# Patient Record
Sex: FEMALE | Race: BLACK OR AFRICAN AMERICAN | Employment: FULL TIME | URBAN - METROPOLITAN AREA
[De-identification: names, ages, dates, MRNs, and addresses within clinical notes are randomized per-mention and may not be internally consistent; named-entity substitution may affect disease eponyms.]

---

## 2018-02-22 ENCOUNTER — HOSPITAL ENCOUNTER (EMERGENCY)
Facility: HOSPITAL | Age: 38
Discharge: HOME | End: 2018-02-22
Attending: EMERGENCY MEDICINE
Payer: MEDICAID

## 2018-02-22 ENCOUNTER — APPOINTMENT (OUTPATIENT)
Dept: GENERAL RADIOLOGY | Facility: HOSPITAL | Age: 38
End: 2018-02-22
Attending: EMERGENCY MEDICINE
Payer: MEDICAID

## 2018-02-22 VITALS
SYSTOLIC BLOOD PRESSURE: 114 MMHG | TEMPERATURE: 99 F | OXYGEN SATURATION: 98 % | BODY MASS INDEX: 33.43 KG/M2 | RESPIRATION RATE: 17 BRPM | HEIGHT: 67 IN | WEIGHT: 213 LBS | HEART RATE: 67 BPM | DIASTOLIC BLOOD PRESSURE: 83 MMHG

## 2018-02-22 DIAGNOSIS — R00.2 PALPITATIONS: Primary | ICD-10-CM

## 2018-02-22 LAB
ANION GAP SERPL CALC-SCNC: 6 MMOL/L (ref 0–18)
BASOPHILS # BLD: 0 K/UL (ref 0–0.2)
BASOPHILS NFR BLD: 0 %
BUN SERPL-MCNC: 13 MG/DL (ref 8–20)
BUN/CREAT SERPL: 19.1 (ref 10–20)
CALCIUM SERPL-MCNC: 8.9 MG/DL (ref 8.5–10.5)
CHLORIDE SERPL-SCNC: 107 MMOL/L (ref 95–110)
CO2 SERPL-SCNC: 23 MMOL/L (ref 22–32)
CREAT SERPL-MCNC: 0.68 MG/DL (ref 0.5–1.5)
EOSINOPHIL # BLD: 0.3 K/UL (ref 0–0.7)
EOSINOPHIL NFR BLD: 4 %
ERYTHROCYTE [DISTWIDTH] IN BLOOD BY AUTOMATED COUNT: 13.7 % (ref 11–15)
GLUCOSE SERPL-MCNC: 98 MG/DL (ref 70–99)
HCT VFR BLD AUTO: 33.6 % (ref 35–48)
HGB BLD-MCNC: 11 G/DL (ref 12–16)
LYMPHOCYTES # BLD: 2.8 K/UL (ref 1–4)
LYMPHOCYTES NFR BLD: 38 %
MCH RBC QN AUTO: 29.6 PG (ref 27–32)
MCHC RBC AUTO-ENTMCNC: 32.6 G/DL (ref 32–37)
MCV RBC AUTO: 90.9 FL (ref 80–100)
MONOCYTES # BLD: 0.6 K/UL (ref 0–1)
MONOCYTES NFR BLD: 8 %
NEUTROPHILS # BLD AUTO: 3.7 K/UL (ref 1.8–7.7)
NEUTROPHILS NFR BLD: 50 %
OSMOLALITY UR CALC.SUM OF ELEC: 282 MOSM/KG (ref 275–295)
PLATELET # BLD AUTO: 288 K/UL (ref 140–400)
PMV BLD AUTO: 9.5 FL (ref 7.4–10.3)
POTASSIUM SERPL-SCNC: 3.4 MMOL/L (ref 3.3–5.1)
RBC # BLD AUTO: 3.69 M/UL (ref 3.7–5.4)
SODIUM SERPL-SCNC: 136 MMOL/L (ref 136–144)
TROPONIN I SERPL-MCNC: 0.02 NG/ML (ref ?–0.03)
WBC # BLD AUTO: 7.3 K/UL (ref 4–11)

## 2018-02-22 PROCEDURE — 93005 ELECTROCARDIOGRAM TRACING: CPT

## 2018-02-22 PROCEDURE — 85007 BL SMEAR W/DIFF WBC COUNT: CPT | Performed by: EMERGENCY MEDICINE

## 2018-02-22 PROCEDURE — 85025 COMPLETE CBC W/AUTO DIFF WBC: CPT | Performed by: EMERGENCY MEDICINE

## 2018-02-22 PROCEDURE — 93010 ELECTROCARDIOGRAM REPORT: CPT | Performed by: EMERGENCY MEDICINE

## 2018-02-22 PROCEDURE — 80048 BASIC METABOLIC PNL TOTAL CA: CPT | Performed by: EMERGENCY MEDICINE

## 2018-02-22 PROCEDURE — 71045 X-RAY EXAM CHEST 1 VIEW: CPT | Performed by: EMERGENCY MEDICINE

## 2018-02-22 PROCEDURE — 84484 ASSAY OF TROPONIN QUANT: CPT | Performed by: EMERGENCY MEDICINE

## 2018-02-22 PROCEDURE — 36415 COLL VENOUS BLD VENIPUNCTURE: CPT

## 2018-02-22 PROCEDURE — 99285 EMERGENCY DEPT VISIT HI MDM: CPT

## 2018-02-22 RX ORDER — CHOLECALCIFEROL (VITAMIN D3) 125 MCG
50000 TABLET ORAL WEEKLY
COMMUNITY
End: 2019-05-31

## 2018-02-22 NOTE — ED INITIAL ASSESSMENT (HPI)
Dagoberto Cortes is here for eval of fluttering sensation in chest and slight dyspnea intermittently for past 12 hrs.

## 2018-02-22 NOTE — ED NOTES
Patient arrives with complaints of palpitations since around 1100 in this morning and has been intermittent throughout the day. Denies N/V/D/F. Per pt, smokes every day approximately 6-7 cigarettes.

## 2018-02-22 NOTE — ED PROVIDER NOTES
Patient Seen in: [facility] AND [facility] Emergency Department    History   Patient presents with:  Arrythmia/Palpitations (cardiovascular)    Stated Complaint: \"fluttering\" feeling    HPI    71-year-old female without significant past medical history present normal.  Moving extremities equally ×4. Skin: warm and dry, no rashes.   Musculoskeletal: neck is supple non tender        Extremities are symmetrical, full range of motion  Psychiatric: patient is oriented X 3, there is no agitation    DIFFERENTIAL DIAGNO Impression:  Palpitations  (primary encounter diagnosis)    Disposition:  Discharge  2/22/2018  1:54 am    Follow-up:  own physician              Medications Prescribed:  Current Discharge Medication List

## 2018-07-30 ENCOUNTER — HOSPITAL ENCOUNTER (EMERGENCY)
Facility: HOSPITAL | Age: 38
Discharge: HOME | End: 2018-07-30
Attending: EMERGENCY MEDICINE
Payer: MEDICAID

## 2018-07-30 VITALS
BODY MASS INDEX: 33 KG/M2 | OXYGEN SATURATION: 100 % | DIASTOLIC BLOOD PRESSURE: 83 MMHG | SYSTOLIC BLOOD PRESSURE: 128 MMHG | RESPIRATION RATE: 22 BRPM | HEART RATE: 82 BPM | TEMPERATURE: 99 F | WEIGHT: 213 LBS

## 2018-07-30 DIAGNOSIS — T78.2XXA ANAPHYLAXIS, INITIAL ENCOUNTER: Primary | ICD-10-CM

## 2018-07-30 PROCEDURE — 99283 EMERGENCY DEPT VISIT LOW MDM: CPT

## 2018-07-30 PROCEDURE — 96372 THER/PROPH/DIAG INJ SC/IM: CPT

## 2018-07-30 RX ORDER — ONDANSETRON 2 MG/ML
4 INJECTION INTRAMUSCULAR; INTRAVENOUS ONCE
Status: DISCONTINUED | OUTPATIENT
Start: 2018-07-30 | End: 2018-07-30

## 2018-07-30 RX ORDER — ONDANSETRON 2 MG/ML
INJECTION INTRAMUSCULAR; INTRAVENOUS
Status: COMPLETED
Start: 2018-07-30 | End: 2018-07-30

## 2018-07-30 NOTE — ED PROVIDER NOTES
Patient Seen in: [facility] Emergency Department    History   Patient presents with:   Allergic Rxn Allergies (immune)    Stated Complaint: allergic reation    HPI    Patient is a 53-year-old female who presents to the emergency department with a c and oriented to person, place, and time. Skin: Skin is warm and dry. Rash noted. Rash is urticarial.   Few generalized urticaria noted   Nursing note and vitals reviewed.           ED Course   Labs Reviewed - No data to display    ED Course as of Jul 30 1

## 2018-11-14 ENCOUNTER — HOSPITAL ENCOUNTER (EMERGENCY)
Facility: HOSPITAL | Age: 38
Discharge: HOME | End: 2018-11-14
Attending: EMERGENCY MEDICINE
Payer: MEDICAID

## 2018-11-14 VITALS
BODY MASS INDEX: 33.9 KG/M2 | TEMPERATURE: 99 F | OXYGEN SATURATION: 99 % | RESPIRATION RATE: 16 BRPM | DIASTOLIC BLOOD PRESSURE: 82 MMHG | HEART RATE: 86 BPM | HEIGHT: 67 IN | WEIGHT: 216 LBS | SYSTOLIC BLOOD PRESSURE: 137 MMHG

## 2018-11-14 DIAGNOSIS — G56.01 RIGHT CARPAL TUNNEL SYNDROME: Primary | ICD-10-CM

## 2018-11-14 PROCEDURE — 99282 EMERGENCY DEPT VISIT SF MDM: CPT

## 2018-11-14 NOTE — ED PROVIDER NOTES
Patient Seen in: [facility] AND [facility] Emergency Department    History   Patient presents with:  Numbness Weakness (neurologic)    Stated Complaint:     HPI    39-year-old female with no significant past medical history presents to the emergency department f pulses  Pulmonary/Chest: CTA b/l with no rales, wheezes. No chest wall tenderness  Abdominal: Nontender. Nondistended. Soft. Bowel sounds are normal.   Back:   : Musculoskeletal: Normal range of motion. No deformity.    Lymphadenopathy: No sig cervica

## 2018-11-14 NOTE — ED INITIAL ASSESSMENT (HPI)
Pt came in for burning and tingling with decreased sensation in her right hand for 3 weeks, worse when sleeping. CMS intact, able to complete ROM. RR even and nonlabored, speaking in full sentences, ambulatory with steady gait.

## 2019-05-31 ENCOUNTER — APPOINTMENT (OUTPATIENT)
Dept: CT IMAGING | Facility: HOSPITAL | Age: 39
DRG: 313 | End: 2019-05-31
Attending: EMERGENCY MEDICINE
Payer: MEDICAID

## 2019-05-31 ENCOUNTER — HOSPITAL ENCOUNTER (INPATIENT)
Facility: HOSPITAL | Age: 39
LOS: 1 days | Discharge: HOME | DRG: 313 | End: 2019-06-01
Attending: EMERGENCY MEDICINE | Admitting: HOSPITALIST
Payer: MEDICAID

## 2019-05-31 ENCOUNTER — APPOINTMENT (OUTPATIENT)
Dept: CV DIAGNOSTICS | Facility: HOSPITAL | Age: 39
DRG: 313 | End: 2019-05-31
Attending: INTERNAL MEDICINE
Payer: MEDICAID

## 2019-05-31 DIAGNOSIS — R77.8 ELEVATED TROPONIN: ICD-10-CM

## 2019-05-31 DIAGNOSIS — R00.2 PALPITATION: ICD-10-CM

## 2019-05-31 DIAGNOSIS — R06.00 DYSPNEA, UNSPECIFIED TYPE: ICD-10-CM

## 2019-05-31 DIAGNOSIS — R07.89 CHEST PAIN, ATYPICAL: Primary | ICD-10-CM

## 2019-05-31 PROBLEM — R79.89 ELEVATED TROPONIN: Status: ACTIVE | Noted: 2019-05-31

## 2019-05-31 PROCEDURE — 93306 TTE W/DOPPLER COMPLETE: CPT | Performed by: INTERNAL MEDICINE

## 2019-05-31 PROCEDURE — 99222 1ST HOSP IP/OBS MODERATE 55: CPT | Performed by: HOSPITALIST

## 2019-05-31 PROCEDURE — 71260 CT THORAX DX C+: CPT | Performed by: EMERGENCY MEDICINE

## 2019-05-31 RX ORDER — POTASSIUM CHLORIDE 20 MEQ/1
40 TABLET, EXTENDED RELEASE ORAL ONCE
Status: COMPLETED | OUTPATIENT
Start: 2019-05-31 | End: 2019-05-31

## 2019-05-31 RX ORDER — KETOROLAC TROMETHAMINE 30 MG/ML
30 INJECTION, SOLUTION INTRAMUSCULAR; INTRAVENOUS ONCE
Status: COMPLETED | OUTPATIENT
Start: 2019-05-31 | End: 2019-05-31

## 2019-05-31 RX ORDER — ASPIRIN 81 MG/1
324 TABLET, CHEWABLE ORAL ONCE
Status: COMPLETED | OUTPATIENT
Start: 2019-05-31 | End: 2019-05-31

## 2019-05-31 RX ORDER — DEXTROSE AND SODIUM CHLORIDE 5; .45 G/100ML; G/100ML
INJECTION, SOLUTION INTRAVENOUS CONTINUOUS
Status: DISCONTINUED | OUTPATIENT
Start: 2019-05-31 | End: 2019-06-01

## 2019-05-31 RX ORDER — FAMOTIDINE 10 MG/ML
20 INJECTION, SOLUTION INTRAVENOUS ONCE
Status: COMPLETED | OUTPATIENT
Start: 2019-05-31 | End: 2019-05-31

## 2019-05-31 RX ORDER — ACETAMINOPHEN 325 MG/1
650 TABLET ORAL EVERY 6 HOURS PRN
Status: DISCONTINUED | OUTPATIENT
Start: 2019-05-31 | End: 2019-06-01

## 2019-05-31 RX ORDER — SODIUM CHLORIDE 0.9 % (FLUSH) 0.9 %
3 SYRINGE (ML) INJECTION AS NEEDED
Status: DISCONTINUED | OUTPATIENT
Start: 2019-05-31 | End: 2019-06-01

## 2019-05-31 RX ORDER — NAPROXEN 500 MG/1
500 TABLET ORAL 2 TIMES DAILY WITH MEALS
Qty: 10 TABLET | Refills: 0 | Status: SHIPPED | OUTPATIENT
Start: 2019-05-31 | End: 2019-06-01

## 2019-05-31 RX ORDER — FAMOTIDINE 20 MG/1
20 TABLET ORAL DAILY
Qty: 7 TABLET | Refills: 0 | Status: SHIPPED | OUTPATIENT
Start: 2019-05-31 | End: 2019-06-01

## 2019-05-31 RX ORDER — NITROGLYCERIN 0.4 MG/1
0.4 TABLET SUBLINGUAL EVERY 5 MIN PRN
Status: DISCONTINUED | OUTPATIENT
Start: 2019-05-31 | End: 2019-06-01

## 2019-05-31 NOTE — H&P
[address] Patient Status:  Inpatient    12/10/1980 MRN [id number]   Location [facility] 3W/SW Attending Ella Gomez MD   Hosp Day # 0 PCP Unknown Pcp     Date:  2019  Date of Pertinent items are noted in HPI. Otherwise negative  Comprehensive 12 point ROS performed      Physical Exam:   Vital Signs:  Blood pressure 115/78, pulse 58, temperature 98 °F (36.7 °C), temperature source Oral, resp.  rate 22, height 5' 7\" (1.702 m), thyroid enlargement. No discrete nodule. No mass effect on the trachea. Correlate with thyroid function tests.   Dictated by (CST): Howard Shaffer MD on 5/31/2019 at 8:34     Approved by (CST): Howard Shaffer MD on 5/31/2019 at 8:39          Ekg 12-lead    R

## 2019-05-31 NOTE — CM/SW NOTE
SW received MDO regarding insurance questions and PCP. SW met w/ pt to discuss eventual needs. SW recommended pt to contact insurance to ask about in network PCP's in the area.  FAHAD offered a list of PCP affiliated / [facility], but pt declined and stated she w

## 2019-05-31 NOTE — CONSULTS
[facility]    Cardiology Consultation    Castilloisabella Kaur Patient Status:  Inpatient    12/10/1980 MRN [id number]   Location [facility] 3W/SW Attending Annie Sanchez MD   Hosp Day # 0 PCP Unknown Pcp     2019  Reason fo (NITROSTAT) SL tab 0.4 mg, 0.4 mg, Sublingual, Q5 Min PRN  •  dextrose 5 %-0.45 % NaCl infusion, , Intravenous, Continuous  •  acetaminophen (TYLENOL) tab 650 mg, 650 mg, Oral, Q6H PRN  •  Pantoprazole Sodium (PROTONIX) 40 mg in Sodium Chloride 0.9 % 10 mL 88 05/31/2019    CA 9.1 05/31/2019    DDIMER 0.50 05/31/2019    MG 2.3 05/31/2019    TROP 0.059 05/31/2019       Imaging:  Ct Chest Pain/pe (iv Only) (cpt=71260)    Result Date: 5/31/2019  CONCLUSION:  1.   No acute pulmonary embolus to the segmental pulmon

## 2019-05-31 NOTE — ED NOTES
Pt presents to ED for chest pain for the last 4 days and shortness of breath. Pt denies any recent coughs or fevers. Pt states when her chest hurts is when she stars feeling short of breath.

## 2019-05-31 NOTE — ED PROVIDER NOTES
Patient Seen in: [facility] Emergency Department    History   Patient presents with:  Chest Pain Angina (cardiovascular)    Stated Complaint: SOB/CP     HPI    11-year-old female who is healthy who does not smoke without recent travel here with he murmur. Pulmonary/Chest: Effort normal. No respiratory distress. Clear and equal breath sounds bilaterally. Abdominal: Soft. There is no tenderness. There is no guarding. Musculoskeletal: Normal range of motion. No edema or tenderness.    Neurological: changes, normal intervals and axis              Ct Chest Pain/pe (iv Only) (cpt=71260)    Result Date: 5/31/2019  PROCEDURE: CT CHEST PAIN/PE (IV ONLY) EM  COMPARISON: None.   INDICATIONS: Short of breath, chest pain  TECHNIQUE: CT images of the chest were consult. Will admit to [facility] telemetry under the hospitalist.  Aspirin and nitro given. CT as above. Patient aware.     Admission disposition: 5/31/2019  8:57 AM                 Disposition and Plan     Clinical Impression:  Chest pain, atypical  (primary

## 2019-05-31 NOTE — PLAN OF CARE
Problem: Patient Centered Care  Goal: Patient preferences are identified and integrated in the patient's plan of care  Description  Interventions:  - What would you like us to know as we care for you?  Lives with family  - Provide timely, complete, and ac Skin integrity remains intact  Description  INTERVENTIONS  - Assess and document risk factors for pressure ulcer development  - Assess and document skin integrity  - Monitor for areas of redness and/or skin breakdown  - Initiate interventions, skin care al Problem: DISCHARGE PLANNING  Goal: Discharge to home or other facility with appropriate resources  Description  INTERVENTIONS:  - Identify barriers to discharge w/pt and caregiver  - Include patient/family/discharge partner in discharge Sharif Mccullough

## 2019-06-01 VITALS
OXYGEN SATURATION: 99 % | HEIGHT: 67 IN | BODY MASS INDEX: 33.27 KG/M2 | HEART RATE: 55 BPM | WEIGHT: 212 LBS | DIASTOLIC BLOOD PRESSURE: 79 MMHG | RESPIRATION RATE: 18 BRPM | SYSTOLIC BLOOD PRESSURE: 122 MMHG | TEMPERATURE: 99 F

## 2019-06-01 PROCEDURE — 99239 HOSP IP/OBS DSCHRG MGMT >30: CPT | Performed by: HOSPITALIST

## 2019-06-01 RX ORDER — PANTOPRAZOLE SODIUM 40 MG/1
40 TABLET, DELAYED RELEASE ORAL
Qty: 30 TABLET | Refills: 0 | Status: SHIPPED | OUTPATIENT
Start: 2019-06-01 | End: 2020-06-09

## 2019-06-01 RX ORDER — POTASSIUM CHLORIDE 20 MEQ/1
40 TABLET, EXTENDED RELEASE ORAL EVERY 4 HOURS
Status: DISCONTINUED | OUTPATIENT
Start: 2019-06-01 | End: 2019-06-01

## 2019-06-01 NOTE — PLAN OF CARE
Problem: Patient Centered Care  Goal: Patient preferences are identified and integrated in the patient's plan of care  Description  Interventions:  - What would you like us to know as we care for you?  Lives with family  - Provide timely, complete, and ac and heart rate control medications as ordered  - Initiate emergency measures for life threatening arrhythmias  - Monitor electrolytes and administer replacement therapy as ordered  Outcome: Adequate for Discharge     Problem: SKIN/TISSUE INTEGRITY - ADULT schedule  Outcome: Adequate for Discharge     Problem: DISCHARGE PLANNING  Goal: Discharge to home or other facility with appropriate resources  Description  INTERVENTIONS:  - Identify barriers to discharge w/pt and caregiver  - Include patient/family/disc

## 2019-06-01 NOTE — DISCHARGE PLANNING
Pt is a/o, denies pain, denies SOB. To be discharged today to home. Went over heart failure discharge instructions. Went over medications and side effects with patient and family. D/C IV, site CDI, D/C tele. Assured patient had belongings from home.  Smiley Mcpherson

## 2019-06-01 NOTE — PROGRESS NOTES
[facility]    Cardiology - [facility]  Progress Note    Bibiana Hopper Patient Status:  Inpatient    12/10/1980 MRN [id number]   Location [facility] 3W/SW Attending Rivka Kaufman MD   Hosp Day # 1 PCP Unknown Pcp       Assess Date: 5/31/2019  CONCLUSION:  1. No acute pulmonary embolus to the segmental pulmonary artery level. 2.  No acute aortic injury; no dissection. 3.  Diffuse thyroid enlargement. No discrete nodule. No mass effect on the trachea.   Correlate with thyroid f

## 2019-06-03 ENCOUNTER — TELEPHONE (OUTPATIENT)
Dept: CARDIOLOGY | Age: 39
End: 2019-06-03

## 2019-06-05 NOTE — DISCHARGE SUMMARY
[facility] HOSPITALIST  DISCHARGE SUMMARY     Chelle Bold Patient Status:  Inpatient    12/10/1980 MRN [id number]   Location [facility] 3W/SW Attending No att. providers found   [address] Day # 1 PCP Unknown Pcp     Date of Admission: 2019  Rupesh EF, LV function  -30 day event monitor on discharge     Enlarged thyroid on CT -->check TSH, ok, no obstructive symptoms  D/w pt, she will need periodic f/u as directed by PCP     Mild anemia, unknown baseline  H/o heavy menses \"all my life\"  Start iron edema.  -----------------------------------------------------------------------------------------------  PATIENT DISCHARGE INSTRUCTIONS: See electronic chart      Hospital Discharge Diagnoses: chest pain    Lace+ Score: 18  59-90 High Risk  29-58 Medium Ri

## 2019-07-28 ENCOUNTER — HOSPITAL ENCOUNTER (EMERGENCY)
Facility: HOSPITAL | Age: 39
Discharge: HOME | End: 2019-07-28
Attending: EMERGENCY MEDICINE
Payer: MEDICAID

## 2019-07-28 ENCOUNTER — APPOINTMENT (OUTPATIENT)
Dept: GENERAL RADIOLOGY | Facility: HOSPITAL | Age: 39
End: 2019-07-28
Attending: EMERGENCY MEDICINE
Payer: MEDICAID

## 2019-07-28 VITALS
RESPIRATION RATE: 18 BRPM | TEMPERATURE: 98 F | DIASTOLIC BLOOD PRESSURE: 77 MMHG | HEIGHT: 67 IN | WEIGHT: 208 LBS | BODY MASS INDEX: 32.65 KG/M2 | HEART RATE: 53 BPM | OXYGEN SATURATION: 100 % | SYSTOLIC BLOOD PRESSURE: 111 MMHG

## 2019-07-28 DIAGNOSIS — M54.12 CERVICAL RADICULOPATHY: Primary | ICD-10-CM

## 2019-07-28 PROCEDURE — 99283 EMERGENCY DEPT VISIT LOW MDM: CPT

## 2019-07-28 PROCEDURE — 73030 X-RAY EXAM OF SHOULDER: CPT | Performed by: EMERGENCY MEDICINE

## 2019-07-28 RX ORDER — METHYLPREDNISOLONE 4 MG/1
TABLET ORAL
Qty: 1 PACKAGE | Refills: 0 | Status: SHIPPED | OUTPATIENT
Start: 2019-07-28 | End: 2020-01-17

## 2019-07-28 RX ORDER — TRAMADOL HYDROCHLORIDE 50 MG/1
50 TABLET ORAL EVERY 8 HOURS PRN
Qty: 10 TABLET | Refills: 0 | Status: SHIPPED | OUTPATIENT
Start: 2019-07-28 | End: 2019-08-04

## 2019-07-28 RX ORDER — CYCLOBENZAPRINE HCL 10 MG
10 TABLET ORAL 3 TIMES DAILY PRN
Qty: 15 TABLET | Refills: 0 | Status: SHIPPED | OUTPATIENT
Start: 2019-07-28 | End: 2019-08-04

## 2019-07-28 NOTE — ED PROVIDER NOTES
Patient Seen in: [facility] AND [facility] Emergency Department    History   Patient presents with:  Shoulder Pain    Stated Complaint: right shoulder pain    HPI    The patient is a 28-year-old female who presents with 5 days of right-sided [facility]: Normal range of motion. Neck supple. No JVD present. Muscular tenderness (Right trapezius tenderness with palpable spasm) present. No spinous process tenderness present. Carotid bruit is not present. Normal range of motion present.        Lynda Barbosa Schedule an appointment as soon as possible for a visit in 2 days          Medications Prescribed:  Current Discharge Medication List    START taking these medications    methylPREDNISolone 4 MG Oral Tablet Therapy Pack  Dosepack: use as directed on packag

## 2019-08-15 ENCOUNTER — OFFICE VISIT (OUTPATIENT)
Dept: INTERNAL MEDICINE CLINIC | Facility: HOSPITAL | Age: 39
End: 2019-08-15
Attending: EMERGENCY MEDICINE

## 2019-08-15 DIAGNOSIS — Z00.00 WELLNESS EXAMINATION: Primary | ICD-10-CM

## 2019-08-15 LAB — RUBV IGG SER-ACNC: 6.5 IU/ML (ref 10–?)

## 2019-08-15 PROCEDURE — 86735 MUMPS ANTIBODY: CPT

## 2019-08-15 PROCEDURE — 86765 RUBEOLA ANTIBODY: CPT

## 2019-08-15 PROCEDURE — 86787 VARICELLA-ZOSTER ANTIBODY: CPT

## 2019-08-15 PROCEDURE — 86762 RUBELLA ANTIBODY: CPT

## 2019-08-15 PROCEDURE — 86480 TB TEST CELL IMMUN MEASURE: CPT

## 2019-08-16 LAB
M TB IFN-G CD4+ T-CELLS BLD-ACNC: 0.03 IU/ML
M TB TUBERC IFN-G BLD QL: NEGATIVE
M TB TUBERC IGNF/MITOGEN IGNF CONTROL: >10 IU/ML
MEV IGG SER-ACNC: 293 AU/ML (ref 30–?)
MUV IGG SER IA-ACNC: 25.6 AU/ML (ref 11–?)
QUANTIFERON TB1 MINUS NIL: 0 IU/ML
QUANTIFERON TB2 MINUS NIL: -0.01 IU/ML
VZV IGG SER IA-ACNC: 27.84 (ref 165–?)

## 2019-08-31 ENCOUNTER — HOSPITAL ENCOUNTER (EMERGENCY)
Facility: HOSPITAL | Age: 39
Discharge: HOME | End: 2019-08-31
Attending: EMERGENCY MEDICINE
Payer: MEDICAID

## 2019-08-31 ENCOUNTER — APPOINTMENT (OUTPATIENT)
Dept: GENERAL RADIOLOGY | Facility: HOSPITAL | Age: 39
End: 2019-08-31
Attending: EMERGENCY MEDICINE
Payer: MEDICAID

## 2019-08-31 ENCOUNTER — APPOINTMENT (OUTPATIENT)
Dept: CT IMAGING | Facility: HOSPITAL | Age: 39
End: 2019-08-31
Attending: EMERGENCY MEDICINE
Payer: MEDICAID

## 2019-08-31 VITALS
OXYGEN SATURATION: 99 % | SYSTOLIC BLOOD PRESSURE: 119 MMHG | HEART RATE: 52 BPM | RESPIRATION RATE: 17 BRPM | TEMPERATURE: 98 F | DIASTOLIC BLOOD PRESSURE: 78 MMHG

## 2019-08-31 DIAGNOSIS — R07.89 CHEST PAIN, ATYPICAL: Primary | ICD-10-CM

## 2019-08-31 DIAGNOSIS — J98.01 BRONCHOSPASM: ICD-10-CM

## 2019-08-31 LAB
ANION GAP SERPL CALC-SCNC: 8 MMOL/L (ref 0–18)
BASOPHILS # BLD AUTO: 0.04 X10(3) UL (ref 0–0.2)
BASOPHILS NFR BLD AUTO: 0.6 %
BUN BLD-MCNC: 14 MG/DL (ref 7–18)
BUN/CREAT SERPL: 19.2 (ref 10–20)
CALCIUM BLD-MCNC: 9.2 MG/DL (ref 8.5–10.1)
CHLORIDE SERPL-SCNC: 107 MMOL/L (ref 98–112)
CO2 SERPL-SCNC: 25 MMOL/L (ref 21–32)
CREAT BLD-MCNC: 0.73 MG/DL (ref 0.55–1.02)
D DIMER PPP FEU-MCNC: 0.54 UG/ML FEU (ref ?–0.5)
DEPRECATED RDW RBC AUTO: 47.8 FL (ref 35.1–46.3)
EOSINOPHIL # BLD AUTO: 0.33 X10(3) UL (ref 0–0.7)
EOSINOPHIL NFR BLD AUTO: 4.6 %
ERYTHROCYTE [DISTWIDTH] IN BLOOD BY AUTOMATED COUNT: 14.2 % (ref 11–15)
GLUCOSE BLD-MCNC: 90 MG/DL (ref 70–99)
HCT VFR BLD AUTO: 32.3 % (ref 35–48)
HGB BLD-MCNC: 10.4 G/DL (ref 12–16)
IMM GRANULOCYTES # BLD AUTO: 0.01 X10(3) UL (ref 0–1)
IMM GRANULOCYTES NFR BLD: 0.1 %
LYMPHOCYTES # BLD AUTO: 2.78 X10(3) UL (ref 1–4)
LYMPHOCYTES NFR BLD AUTO: 39.1 %
MCH RBC QN AUTO: 29.5 PG (ref 26–34)
MCHC RBC AUTO-ENTMCNC: 32.2 G/DL (ref 31–37)
MCV RBC AUTO: 91.5 FL (ref 80–100)
MONOCYTES # BLD AUTO: 0.74 X10(3) UL (ref 0.1–1)
MONOCYTES NFR BLD AUTO: 10.4 %
NEUTROPHILS # BLD AUTO: 3.21 X10 (3) UL (ref 1.5–7.7)
NEUTROPHILS # BLD AUTO: 3.21 X10(3) UL (ref 1.5–7.7)
NEUTROPHILS NFR BLD AUTO: 45.2 %
OSMOLALITY SERPL CALC.SUM OF ELEC: 290 MOSM/KG (ref 275–295)
PLATELET # BLD AUTO: 293 10(3)UL (ref 150–450)
POTASSIUM SERPL-SCNC: 3.4 MMOL/L (ref 3.5–5.1)
RBC # BLD AUTO: 3.53 X10(6)UL (ref 3.8–5.3)
SODIUM SERPL-SCNC: 140 MMOL/L (ref 136–145)
TROPONIN I SERPL-MCNC: <0.045 NG/ML (ref ?–0.04)
WBC # BLD AUTO: 7.1 X10(3) UL (ref 4–11)

## 2019-08-31 PROCEDURE — 71045 X-RAY EXAM CHEST 1 VIEW: CPT | Performed by: EMERGENCY MEDICINE

## 2019-08-31 PROCEDURE — 85025 COMPLETE CBC W/AUTO DIFF WBC: CPT | Performed by: EMERGENCY MEDICINE

## 2019-08-31 PROCEDURE — 85379 FIBRIN DEGRADATION QUANT: CPT | Performed by: EMERGENCY MEDICINE

## 2019-08-31 PROCEDURE — 93010 ELECTROCARDIOGRAM REPORT: CPT | Performed by: EMERGENCY MEDICINE

## 2019-08-31 PROCEDURE — 93005 ELECTROCARDIOGRAM TRACING: CPT

## 2019-08-31 PROCEDURE — 99285 EMERGENCY DEPT VISIT HI MDM: CPT

## 2019-08-31 PROCEDURE — 36415 COLL VENOUS BLD VENIPUNCTURE: CPT

## 2019-08-31 PROCEDURE — 71260 CT THORAX DX C+: CPT | Performed by: EMERGENCY MEDICINE

## 2019-08-31 PROCEDURE — 94640 AIRWAY INHALATION TREATMENT: CPT

## 2019-08-31 PROCEDURE — 80048 BASIC METABOLIC PNL TOTAL CA: CPT | Performed by: EMERGENCY MEDICINE

## 2019-08-31 PROCEDURE — 84484 ASSAY OF TROPONIN QUANT: CPT | Performed by: EMERGENCY MEDICINE

## 2019-08-31 RX ORDER — IPRATROPIUM BROMIDE AND ALBUTEROL SULFATE 2.5; .5 MG/3ML; MG/3ML
3 SOLUTION RESPIRATORY (INHALATION) ONCE
Status: COMPLETED | OUTPATIENT
Start: 2019-08-31 | End: 2019-08-31

## 2019-08-31 RX ORDER — ALBUTEROL SULFATE 90 UG/1
2 AEROSOL, METERED RESPIRATORY (INHALATION) EVERY 4 HOURS PRN
Qty: 1 INHALER | Refills: 0 | Status: SHIPPED | OUTPATIENT
Start: 2019-08-31 | End: 2019-08-31

## 2019-08-31 RX ORDER — POTASSIUM CHLORIDE 20 MEQ/1
40 TABLET, EXTENDED RELEASE ORAL ONCE
Status: COMPLETED | OUTPATIENT
Start: 2019-08-31 | End: 2019-08-31

## 2019-08-31 RX ORDER — MELOXICAM 7.5 MG/1
7.5 TABLET ORAL DAILY
Qty: 14 TABLET | Refills: 0 | Status: SHIPPED | OUTPATIENT
Start: 2019-08-31 | End: 2019-09-14

## 2019-08-31 RX ORDER — ALBUTEROL SULFATE 90 UG/1
2 AEROSOL, METERED RESPIRATORY (INHALATION) EVERY 4 HOURS PRN
Qty: 1 INHALER | Refills: 0 | Status: SHIPPED | OUTPATIENT
Start: 2019-08-31 | End: 2019-09-30

## 2019-08-31 NOTE — ED PROVIDER NOTES
Patient Seen in: [facility] AND [facility] Emergency Department    History   Patient presents with:  Dyspnea RAMSES SOB (respiratory)  Chest Pain Angina (cardiovascular)    Stated Complaint: SOB, chest pressure     HPI    34-year-old female without past medical histo other systems reviewed and negative except as noted above. PSFH elements reviewed from today and agreed except as otherwise stated in HPI.     Physical Exam     ED Triage Vitals [08/31/19 0215]   /90   Pulse 57   Resp 20   Temp 97.9 °F (36.6 °C) without ischemic change as interpreted by myself         Preliminary Radiology Report  [facility]  [phone] - Phone    Vbkewmt [id number]    NAME: Yulia Tilley OF EXAM: 08/31/2019  Patient No:  [id number]  Physician:  Domenic Stevens Information (per [facility]):          CT PULMONARY ANGIOGRAM:    IMPRESSION    The study is technically diagnostic . No evidence of pulmonary embolism. Images of the aorta are without evidence of dissection or aneurysm . Lungs are clear. diagnosis)  Bronchospasm    Disposition:  Discharge    Follow-up:  Your PCP    Call  For followup and re-evaluation.       Medications Prescribed:  Discharge Medication List as of 8/31/2019  7:17 AM    START taking these medications    Meloxicam 7.5 MG Oral

## 2019-08-31 NOTE — ED NOTES
Discharge instructions and follow up information given to patient. Patient verbalized understanding. No distress.   Left ED ambulatory steady gait

## 2019-08-31 NOTE — ED NOTES
After patient returned from CT she complained of momentary lip trembling and chest tightness, states that she feels her anxiety is returning.  Patient evaluated by Roberto Manning RN and then myself, at the time this RN arrived, patient states that the symptoms hav

## 2020-01-17 ENCOUNTER — OFFICE VISIT (OUTPATIENT)
Dept: INTERNAL MEDICINE CLINIC | Facility: CLINIC | Age: 40
End: 2020-01-17
Payer: COMMERCIAL

## 2020-01-17 VITALS
BODY MASS INDEX: 32.49 KG/M2 | DIASTOLIC BLOOD PRESSURE: 84 MMHG | TEMPERATURE: 99 F | HEIGHT: 67 IN | WEIGHT: 207 LBS | SYSTOLIC BLOOD PRESSURE: 126 MMHG | HEART RATE: 72 BPM | RESPIRATION RATE: 18 BRPM

## 2020-01-17 DIAGNOSIS — Z00.00 ANNUAL PHYSICAL EXAM: ICD-10-CM

## 2020-01-17 DIAGNOSIS — Z12.39 SCREENING FOR BREAST CANCER: ICD-10-CM

## 2020-01-17 DIAGNOSIS — R73.01 IFG (IMPAIRED FASTING GLUCOSE): Primary | ICD-10-CM

## 2020-01-17 PROBLEM — R79.89 ELEVATED TROPONIN: Status: RESOLVED | Noted: 2019-05-31 | Resolved: 2020-01-17

## 2020-01-17 PROBLEM — R07.89 CHEST PAIN, ATYPICAL: Status: RESOLVED | Noted: 2019-05-31 | Resolved: 2020-01-17

## 2020-01-17 PROBLEM — R06.00 DYSPNEA, UNSPECIFIED TYPE: Status: RESOLVED | Noted: 2019-05-31 | Resolved: 2020-01-17

## 2020-01-17 PROBLEM — R77.8 ELEVATED TROPONIN: Status: RESOLVED | Noted: 2019-05-31 | Resolved: 2020-01-17

## 2020-01-17 PROCEDURE — 99204 OFFICE O/P NEW MOD 45 MIN: CPT | Performed by: INTERNAL MEDICINE

## 2020-01-17 RX ORDER — NICOTINE 21 MG/24HR
1 PATCH, TRANSDERMAL 24 HOURS TRANSDERMAL EVERY 24 HOURS
Qty: 30 PATCH | Refills: 0 | Status: SHIPPED | OUTPATIENT
Start: 2020-01-17 | End: 2020-09-15

## 2020-01-17 RX ORDER — MONTELUKAST SODIUM 10 MG/1
10 TABLET ORAL NIGHTLY
Qty: 30 TABLET | Refills: 0 | Status: SHIPPED | OUTPATIENT
Start: 2020-01-17 | End: 2020-12-15

## 2020-01-17 RX ORDER — FLUTICASONE PROPIONATE 50 MCG
2 SPRAY, SUSPENSION (ML) NASAL DAILY
Qty: 3 BOTTLE | Refills: 3 | Status: SHIPPED | OUTPATIENT
Start: 2020-01-17 | End: 2020-12-15

## 2020-01-17 RX ORDER — MECLIZINE HCL 12.5 MG/1
12.5 TABLET ORAL 3 TIMES DAILY PRN
Qty: 30 TABLET | Refills: 0 | Status: SHIPPED | OUTPATIENT
Start: 2020-01-17 | End: 2020-12-15

## 2020-01-17 NOTE — PATIENT INSTRUCTIONS
Dizziness ?  bpv- try meclizine , discussed about epley manouver , if not  Better follow up   Smoker -   Advised her  To quit smoking , try nicotine patch   ifg- watch diet , avoid carbohydrates - will check hba1c   Screening for breast  Cancer referal for

## 2020-01-17 NOTE — PROGRESS NOTES
HPI:    Patient ID: Tyler Collins is a 35year old female.     HPI  She is here today to establish care    According to her she did have some blood work done recently and was told she is prediabetic her HbA1c went up she was advised on diet and recommende not nervous/anxious.           Current Outpatient Medications   Medication Sig Dispense Refill   • methylPREDNISolone 4 MG Oral Tablet Therapy Pack Dosepack: use as directed on packaging 1 Package 0   • Pantoprazole Sodium 40 MG Oral Tab EC Take 1 tablet (4 discharge. No scleral icterus. Neck: Normal range of motion. Neck supple. No JVD present. No tracheal tenderness present. No tracheal deviation present. No thyroid mass and no thyromegaly present.    Cardiovascular: Normal rate, regular rhythm, normal hea

## 2020-01-21 ENCOUNTER — TELEPHONE (OUTPATIENT)
Dept: INTERNAL MEDICINE CLINIC | Facility: CLINIC | Age: 40
End: 2020-01-21

## 2020-01-21 ENCOUNTER — HOSPITAL ENCOUNTER (OUTPATIENT)
Dept: MAMMOGRAPHY | Facility: HOSPITAL | Age: 40
Discharge: HOME | End: 2020-01-21
Attending: INTERNAL MEDICINE
Payer: COMMERCIAL

## 2020-01-21 ENCOUNTER — LAB ENCOUNTER (OUTPATIENT)
Dept: LAB | Facility: HOSPITAL | Age: 40
End: 2020-01-21
Attending: INTERNAL MEDICINE
Payer: COMMERCIAL

## 2020-01-21 DIAGNOSIS — D50.8 OTHER IRON DEFICIENCY ANEMIA: ICD-10-CM

## 2020-01-21 DIAGNOSIS — Z11.3 SCREENING FOR STD (SEXUALLY TRANSMITTED DISEASE): ICD-10-CM

## 2020-01-21 DIAGNOSIS — Z11.3 SCREENING FOR STD (SEXUALLY TRANSMITTED DISEASE): Primary | ICD-10-CM

## 2020-01-21 DIAGNOSIS — Z12.39 SCREENING FOR BREAST CANCER: ICD-10-CM

## 2020-01-21 DIAGNOSIS — Z00.00 ANNUAL PHYSICAL EXAM: ICD-10-CM

## 2020-01-21 LAB
ALBUMIN SERPL-MCNC: 3.6 G/DL (ref 3.4–5)
ALBUMIN/GLOB SERPL: 0.8 {RATIO} (ref 1–2)
ALP LIVER SERPL-CCNC: 52 U/L (ref 37–98)
ALT SERPL-CCNC: 21 U/L (ref 13–56)
ANION GAP SERPL CALC-SCNC: 4 MMOL/L (ref 0–18)
AST SERPL-CCNC: 17 U/L (ref 15–37)
BASOPHILS # BLD AUTO: 0.04 X10(3) UL (ref 0–0.2)
BASOPHILS NFR BLD AUTO: 0.6 %
BILIRUB SERPL-MCNC: 0.5 MG/DL (ref 0.1–2)
BUN BLD-MCNC: 14 MG/DL (ref 7–18)
BUN/CREAT SERPL: 19.2 (ref 10–20)
C TRACH DNA SPEC QL NAA+PROBE: NEGATIVE
CALCIUM BLD-MCNC: 9.2 MG/DL (ref 8.5–10.1)
CHLORIDE SERPL-SCNC: 108 MMOL/L (ref 98–112)
CHOLEST SMN-MCNC: 159 MG/DL (ref ?–200)
CO2 SERPL-SCNC: 27 MMOL/L (ref 21–32)
CREAT BLD-MCNC: 0.73 MG/DL (ref 0.55–1.02)
DEPRECATED RDW RBC AUTO: 47 FL (ref 35.1–46.3)
EOSINOPHIL # BLD AUTO: 0.37 X10(3) UL (ref 0–0.7)
EOSINOPHIL NFR BLD AUTO: 5.6 %
ERYTHROCYTE [DISTWIDTH] IN BLOOD BY AUTOMATED COUNT: 14.1 % (ref 11–15)
EST. AVERAGE GLUCOSE BLD GHB EST-MCNC: 120 MG/DL (ref 68–126)
GLOBULIN PLAS-MCNC: 4.4 G/DL (ref 2.8–4.4)
GLUCOSE BLD-MCNC: 87 MG/DL (ref 70–99)
HBA1C MFR BLD HPLC: 5.8 % (ref ?–5.7)
HBV SURFACE AG SER-ACNC: <0.1 [IU]/L
HBV SURFACE AG SERPL QL IA: NONREACTIVE
HCT VFR BLD AUTO: 35.8 % (ref 35–48)
HCV AB SERPL QL IA: NONREACTIVE
HDLC SERPL-MCNC: 56 MG/DL (ref 40–59)
HGB BLD-MCNC: 11.2 G/DL (ref 12–16)
IMM GRANULOCYTES # BLD AUTO: 0.02 X10(3) UL (ref 0–1)
IMM GRANULOCYTES NFR BLD: 0.3 %
IRON SATURATION: 13 % (ref 15–50)
IRON SERPL-MCNC: 58 UG/DL (ref 50–170)
LDLC SERPL CALC-MCNC: 91 MG/DL (ref ?–100)
LYMPHOCYTES # BLD AUTO: 1.98 X10(3) UL (ref 1–4)
LYMPHOCYTES NFR BLD AUTO: 29.9 %
M PROTEIN MFR SERPL ELPH: 8 G/DL (ref 6.4–8.2)
MCH RBC QN AUTO: 28.5 PG (ref 26–34)
MCHC RBC AUTO-ENTMCNC: 31.3 G/DL (ref 31–37)
MCV RBC AUTO: 91.1 FL (ref 80–100)
MONOCYTES # BLD AUTO: 0.66 X10(3) UL (ref 0.1–1)
MONOCYTES NFR BLD AUTO: 10 %
N GONORRHOEA DNA SPEC QL NAA+PROBE: NEGATIVE
NEUTROPHILS # BLD AUTO: 3.56 X10 (3) UL (ref 1.5–7.7)
NEUTROPHILS # BLD AUTO: 3.56 X10(3) UL (ref 1.5–7.7)
NEUTROPHILS NFR BLD AUTO: 53.6 %
NONHDLC SERPL-MCNC: 103 MG/DL (ref ?–130)
OSMOLALITY SERPL CALC.SUM OF ELEC: 288 MOSM/KG (ref 275–295)
PATIENT FASTING Y/N/NP: YES
PATIENT FASTING Y/N/NP: YES
PLATELET # BLD AUTO: 349 10(3)UL (ref 150–450)
POTASSIUM SERPL-SCNC: 3.7 MMOL/L (ref 3.5–5.1)
RBC # BLD AUTO: 3.93 X10(6)UL (ref 3.8–5.3)
SODIUM SERPL-SCNC: 139 MMOL/L (ref 136–145)
TOTAL IRON BINDING CAPACITY: 462 UG/DL (ref 240–450)
TRANSFERRIN SERPL-MCNC: 310 MG/DL (ref 200–360)
TRIGL SERPL-MCNC: 61 MG/DL (ref 30–149)
TSI SER-ACNC: 0.72 MIU/ML (ref 0.36–3.74)
VIT B12 SERPL-MCNC: 510 PG/ML (ref 193–986)
VLDLC SERPL CALC-MCNC: 12 MG/DL (ref 0–30)
WBC # BLD AUTO: 6.6 X10(3) UL (ref 4–11)

## 2020-01-21 PROCEDURE — 77063 BREAST TOMOSYNTHESIS BI: CPT | Performed by: INTERNAL MEDICINE

## 2020-01-21 PROCEDURE — 36415 COLL VENOUS BLD VENIPUNCTURE: CPT

## 2020-01-21 PROCEDURE — 77067 SCR MAMMO BI INCL CAD: CPT | Performed by: INTERNAL MEDICINE

## 2020-01-21 PROCEDURE — 85025 COMPLETE CBC W/AUTO DIFF WBC: CPT

## 2020-01-21 PROCEDURE — 86803 HEPATITIS C AB TEST: CPT

## 2020-01-21 PROCEDURE — 87389 HIV-1 AG W/HIV-1&-2 AB AG IA: CPT

## 2020-01-21 PROCEDURE — 86780 TREPONEMA PALLIDUM: CPT

## 2020-01-21 PROCEDURE — 83036 HEMOGLOBIN GLYCOSYLATED A1C: CPT

## 2020-01-21 PROCEDURE — 87491 CHLMYD TRACH DNA AMP PROBE: CPT

## 2020-01-21 PROCEDURE — 84443 ASSAY THYROID STIM HORMONE: CPT

## 2020-01-21 PROCEDURE — 87340 HEPATITIS B SURFACE AG IA: CPT

## 2020-01-21 PROCEDURE — 83540 ASSAY OF IRON: CPT

## 2020-01-21 PROCEDURE — 82607 VITAMIN B-12: CPT

## 2020-01-21 PROCEDURE — 80061 LIPID PANEL: CPT

## 2020-01-21 PROCEDURE — 80053 COMPREHEN METABOLIC PANEL: CPT

## 2020-01-21 PROCEDURE — 84466 ASSAY OF TRANSFERRIN: CPT

## 2020-01-21 PROCEDURE — 87591 N.GONORRHOEAE DNA AMP PROB: CPT

## 2020-01-21 NOTE — TELEPHONE ENCOUNTER
Patient is currently in out patient lab she was told she ws having the STD pane done please order she is waiting

## 2020-01-22 ENCOUNTER — OFFICE VISIT (OUTPATIENT)
Dept: INTERNAL MEDICINE CLINIC | Facility: CLINIC | Age: 40
End: 2020-01-22
Payer: COMMERCIAL

## 2020-01-22 ENCOUNTER — NURSE TRIAGE (OUTPATIENT)
Dept: INTERNAL MEDICINE CLINIC | Facility: CLINIC | Age: 40
End: 2020-01-22

## 2020-01-22 VITALS
TEMPERATURE: 98 F | RESPIRATION RATE: 18 BRPM | DIASTOLIC BLOOD PRESSURE: 84 MMHG | HEART RATE: 81 BPM | BODY MASS INDEX: 31.23 KG/M2 | HEIGHT: 67 IN | SYSTOLIC BLOOD PRESSURE: 129 MMHG | WEIGHT: 199 LBS

## 2020-01-22 DIAGNOSIS — N89.8 VAGINAL DISCHARGE: Primary | ICD-10-CM

## 2020-01-22 PROBLEM — D50.0 IRON DEFICIENCY ANEMIA DUE TO CHRONIC BLOOD LOSS: Status: ACTIVE | Noted: 2020-01-22

## 2020-01-22 LAB — T PALLIDUM AB SER QL: NEGATIVE

## 2020-01-22 PROCEDURE — 99214 OFFICE O/P EST MOD 30 MIN: CPT | Performed by: INTERNAL MEDICINE

## 2020-01-22 NOTE — PROGRESS NOTES
HPI:    Patient ID: Leroy Gonzalez is a 35year old female. HPI she is here today complainig of vaginal  discharge . According  To her is ongoing for one week . She describes white discharge , foul smelling and itchiness .  She did use monostat otc  But Oral Tab Take 1 tablet (325 mg total) by mouth daily with breakfast. 90 tablet 0   • Fluticasone Propionate 50 MCG/ACT Nasal Suspension 2 sprays by Each Nare route daily.  3 Bottle 3   • Montelukast Sodium 10 MG Oral Tab Take 1 tablet (10 mg total) by mouth cyanotic. No oropharyngeal exudate, posterior oropharyngeal edema or posterior oropharyngeal erythema. Eyes: Pupils are equal, round, and reactive to light. Conjunctivae and EOM are normal. Right eye exhibits no discharge. Left eye exhibits no discharge. [E]      Meds This Visit:  Requested Prescriptions      No prescriptions requested or ordered in this encounter       Imaging & Referrals:  None        BA#[id number]

## 2020-01-22 NOTE — PATIENT INSTRUCTIONS
Vaginal discharge - will check vaginosis screen , will call her with results  Iron deficiency anemia -advised her to continue with iron pills take vitamin C to increase absorption of iron , will check CBC in 3 months

## 2020-01-22 NOTE — TELEPHONE ENCOUNTER
Action Requested: Summary for Provider     []  Critical Lab, Recommendations Needed  [] Need Additional Advice  []   FYI    []   Need Orders  [] Need Medications Sent to Pharmacy  []  Other     SUMMARY: Pt asking for OV for vaginal itching, states feels ha

## 2020-01-24 ENCOUNTER — HOSPITAL ENCOUNTER (OUTPATIENT)
Dept: MAMMOGRAPHY | Facility: HOSPITAL | Age: 40
Discharge: HOME | End: 2020-01-24
Attending: INTERNAL MEDICINE
Payer: COMMERCIAL

## 2020-01-24 ENCOUNTER — HOSPITAL ENCOUNTER (OUTPATIENT)
Dept: ULTRASOUND IMAGING | Facility: HOSPITAL | Age: 40
Discharge: HOME | End: 2020-01-24
Attending: INTERNAL MEDICINE
Payer: COMMERCIAL

## 2020-01-24 DIAGNOSIS — R92.8 ABNORMAL MAMMOGRAM: ICD-10-CM

## 2020-01-24 LAB
GENITAL VAGINOSIS SCREEN: NEGATIVE
TRICHOMONAS SCREEN: POSITIVE

## 2020-01-24 PROCEDURE — 77065 DX MAMMO INCL CAD UNI: CPT | Performed by: INTERNAL MEDICINE

## 2020-01-24 PROCEDURE — 76642 ULTRASOUND BREAST LIMITED: CPT | Performed by: INTERNAL MEDICINE

## 2020-01-24 PROCEDURE — 77061 BREAST TOMOSYNTHESIS UNI: CPT | Performed by: INTERNAL MEDICINE

## 2020-03-17 ENCOUNTER — TELEPHONE (OUTPATIENT)
Dept: INTERNAL MEDICINE CLINIC | Facility: CLINIC | Age: 40
End: 2020-03-17

## 2020-03-17 NOTE — TELEPHONE ENCOUNTER
Your patient called with sinus pressure in her head and around her eyes. She states that this has been going on for around 4-5 days. She states that she uses a cleaning supply that also seems to make symptoms worse and slightly dizzy. Denies cough and fever. Please contact patient to determine if they should be treated over the phone by you or requires further testing. Please refer to the \"Testing and Travel Recommendations\" provided by the health system leadership.     Best contact number for patient is:  [phone]

## 2020-03-17 NOTE — TELEPHONE ENCOUNTER
I spoke with her , she has sinus pressure and pain ongoing for more then one week,is getting worseshe denies any fever or chills, no earache or sore throat , no recent travel or sick contact with anyone with coronavirus . explained to her to stay home, drink more fluids, steam can help, tylenol as needed, will send amox ,take with food,

## 2020-03-23 ENCOUNTER — TELEPHONE (OUTPATIENT)
Dept: OTHER | Age: 40
End: 2020-03-23

## 2020-03-23 NOTE — TELEPHONE ENCOUNTER
Pt called to follow up on note for work. States letter needs to be faxed to the the occupational health dept, fax # [phone].

## 2020-03-23 NOTE — TELEPHONE ENCOUNTER
Patient checking status of return to work note and she suppose to start today at 10:30 AM, please call when letter ready for pick

## 2020-03-23 NOTE — TELEPHONE ENCOUNTER
Patient calling asking about status of note. Relayed message from below that it was faxed to the number provided.

## 2020-03-23 NOTE — TELEPHONE ENCOUNTER
Pt is requesting note to return to work. Pt states she was out Tuesday through Sunday and will return to work today.  Please advise

## 2020-03-24 NOTE — TELEPHONE ENCOUNTER
Pt calling back states [facility] fax number [phone]    Pt works a [employer] states she called dept and they have not received return to work note.      [facility] [address] [place] [address] (ext [phone]) and confirmed fax # is [phone]

## 2020-03-24 NOTE — TELEPHONE ENCOUNTER
[employer] confirmed that return to work note was received and employees health will be notifying pt's supervisor. Pt notified that note has been received.

## 2020-04-06 ENCOUNTER — TELEPHONE (OUTPATIENT)
Dept: INTERNAL MEDICINE CLINIC | Facility: CLINIC | Age: 40
End: 2020-04-06

## 2020-04-06 ENCOUNTER — NURSE TRIAGE (OUTPATIENT)
Dept: INTERNAL MEDICINE CLINIC | Facility: CLINIC | Age: 40
End: 2020-04-06

## 2020-04-06 NOTE — TELEPHONE ENCOUNTER
Action Requested: Summary for Provider     []  Critical Lab, Recommendations Needed  [x] Need Additional Advice  []   FYI    []   Need Orders  [] Need Medications Sent to Pharmacy  []  Other     SUMMARY: Patient requesting appt with Dr Vince Lloyd for symptoms o

## 2020-04-06 NOTE — TELEPHONE ENCOUNTER
Pt informed of response below and pt prefers office visit tomorrow and scheduled for 10:30 am at [facility] office. Denies fever.

## 2020-04-06 NOTE — TELEPHONE ENCOUNTER
Forms department received mental health disability forms. Logged for processing. No hipaa/fcr on file.

## 2020-04-07 ENCOUNTER — VIRTUAL PHONE E/M (OUTPATIENT)
Dept: INTERNAL MEDICINE CLINIC | Facility: CLINIC | Age: 40
End: 2020-04-07

## 2020-04-07 DIAGNOSIS — F41.9 ANXIETY: ICD-10-CM

## 2020-04-07 PROCEDURE — 99212 OFFICE O/P EST SF 10 MIN: CPT | Performed by: INTERNAL MEDICINE

## 2020-04-07 RX ORDER — ALPRAZOLAM 0.5 MG/1
0.5 TABLET ORAL NIGHTLY PRN
Qty: 30 TABLET | Refills: 0 | Status: SHIPPED | OUTPATIENT
Start: 2020-04-07 | End: 2020-06-09

## 2020-04-07 NOTE — PROGRESS NOTES
Virtual/Telephone Check-In    [facility] verbally consents to a Virtual/Telephone Check-In service on 04/07/20. Patient understands and accepts financial responsibility for any deductible, co-insurance and/or co-pays associated with this service.

## 2020-04-14 ENCOUNTER — TELEPHONE (OUTPATIENT)
Dept: INTERNAL MEDICINE CLINIC | Facility: CLINIC | Age: 40
End: 2020-04-14

## 2020-04-14 NOTE — TELEPHONE ENCOUNTER
Pt called to check status of forms. Informed her turn around time of 10-15 business days and to ask for an ext if needed. Sent pt hipaa and fcr through email.

## 2020-04-15 NOTE — TELEPHONE ENCOUNTER
Pt stated she received a letter stated she needs to pay a fee for forms to be filled and she have questions, unable to reach [address] [facility], Left detailed message, relayed # to Pt

## 2020-04-15 NOTE — TELEPHONE ENCOUNTER
Spoke to pt and asked for the HIPAA release since her form is a MH eval. Will bill pt. Explained that she needs consistent visits with both PCP and  for disab.  Pt off work from 4/1/2020 til pending release by [facility]

## 2020-04-30 NOTE — TELEPHONE ENCOUNTER
Dr. Renato Mckenzie,    Disab: pt off work from 4/1/20 til pending release by [facility]    Please sign off on form:  -Highlight the patient and hit \"Chart\" button. -In Chart Review, w/in the Encounter tab - click 1 time on the Telephone call encounter for 4/6/20.  Scroll

## 2020-05-01 ENCOUNTER — TELEPHONE (OUTPATIENT)
Dept: INTERNAL MEDICINE CLINIC | Facility: CLINIC | Age: 40
End: 2020-05-01

## 2020-05-01 ENCOUNTER — MOBILE ENCOUNTER (OUTPATIENT)
Dept: INTERNAL MEDICINE CLINIC | Facility: CLINIC | Age: 40
End: 2020-05-01

## 2020-05-01 NOTE — TELEPHONE ENCOUNTER
Returned patients call stating she did not receive the fax requested for copy of form. Requesting a copy of form be  emailed to [email or web address]  SC

## 2020-05-01 NOTE — TELEPHONE ENCOUNTER
Evan Mahmood, Mental health counselor from patients employer states patient is still waiting for a sleep aid/ anxiety medication. Evan Mahmood is worried about patient because she is not sleeping at all.  Also states her employer is pressuring her regarding [facility] paper work

## 2020-05-01 NOTE — TELEPHONE ENCOUNTER
Pt wanted forms faxed to roberto and herself. Fax to roberto went through fax to pt kept failing. Sent pt copy securely to email listed below.

## 2020-05-03 NOTE — TELEPHONE ENCOUNTER
Can you pls harshad and schedule tele visit , for Monday , per her social service at work she is depressed

## 2020-05-04 ENCOUNTER — TELEPHONE (OUTPATIENT)
Dept: INTERNAL MEDICINE CLINIC | Facility: CLINIC | Age: 40
End: 2020-05-04

## 2020-05-04 NOTE — TELEPHONE ENCOUNTER
Continue to have issues matthieu fax not going through. Notified patient, suggested she sign up for [employer] and mailed her a copy.  SC

## 2020-05-04 NOTE — TELEPHONE ENCOUNTER
Returned patients call stating she cannot open her secured email and to refax to her sister at [phone] which we had issues and could not get the fax to go through.  SC

## 2020-05-04 NOTE — TELEPHONE ENCOUNTER
Looking for copy of FMLA forms  [employer] said they never received  Do not see completed forms in chart  Or who called  Patient   Please if someone could contact patient  Phone  [phone]

## 2020-05-05 NOTE — PROGRESS NOTES
Virtual Telephone Check-In    Castillo Kaur verbally consents to a Virtual/Telephone Check-In visit on 05/05/20. Patient understands and accepts financial responsibility for any deductible, co-insurance and/or co-pays associated with this service.

## 2020-05-19 PROBLEM — F41.9 ANXIETY DISORDER: Status: ACTIVE | Noted: 2020-05-19

## 2020-05-21 ENCOUNTER — TELEPHONE (OUTPATIENT)
Dept: INTERNAL MEDICINE CLINIC | Facility: CLINIC | Age: 40
End: 2020-05-21

## 2020-05-22 NOTE — TELEPHONE ENCOUNTER
[employer] calling to confirm that fax was received yesterday of [facility] paperwork, which needs to be completed.

## 2020-06-03 NOTE — TELEPHONE ENCOUNTER
Faxed blank Mental health form from [address] back to [address] -[phone] to have them forward to pt's [address] [place] [address] provider to fill out.

## 2020-06-03 NOTE — TELEPHONE ENCOUNTER
Faxed blank Mental health form from [address] back to [address] -[phone] to have them forward to 's [facility] provider to fill out.

## 2020-06-08 ENCOUNTER — LAB ENCOUNTER (OUTPATIENT)
Dept: LAB | Facility: HOSPITAL | Age: 40
End: 2020-06-08
Attending: INTERNAL MEDICINE
Payer: COMMERCIAL

## 2020-06-08 DIAGNOSIS — R73.01 IFG (IMPAIRED FASTING GLUCOSE): ICD-10-CM

## 2020-06-08 DIAGNOSIS — E61.1 IRON DEFICIENCY: ICD-10-CM

## 2020-06-08 DIAGNOSIS — F41.9 ANXIETY DISORDER, UNSPECIFIED TYPE: ICD-10-CM

## 2020-06-08 DIAGNOSIS — D50.8 OTHER IRON DEFICIENCY ANEMIA: ICD-10-CM

## 2020-06-08 PROCEDURE — 84466 ASSAY OF TRANSFERRIN: CPT

## 2020-06-08 PROCEDURE — 82607 VITAMIN B-12: CPT

## 2020-06-08 PROCEDURE — 85025 COMPLETE CBC W/AUTO DIFF WBC: CPT

## 2020-06-08 PROCEDURE — 83540 ASSAY OF IRON: CPT

## 2020-06-08 PROCEDURE — 80061 LIPID PANEL: CPT

## 2020-06-08 PROCEDURE — 80053 COMPREHEN METABOLIC PANEL: CPT

## 2020-06-08 PROCEDURE — 83036 HEMOGLOBIN GLYCOSYLATED A1C: CPT

## 2020-06-08 PROCEDURE — 36415 COLL VENOUS BLD VENIPUNCTURE: CPT

## 2020-06-08 PROCEDURE — 82746 ASSAY OF FOLIC ACID SERUM: CPT

## 2020-06-08 PROCEDURE — 84443 ASSAY THYROID STIM HORMONE: CPT

## 2020-06-08 PROCEDURE — 82306 VITAMIN D 25 HYDROXY: CPT

## 2020-06-09 NOTE — PROGRESS NOTES
HPI:    Patient ID: Gil Carter is a 35year old female.     HPI she is here today for follow-up on her anxiety   She states that that she still feels anxious that she is following with psychiatry they put her on citalopram but she stopped taking for so sleep disturbance. The patient is nervous/anxious.           Current Outpatient Medications   Medication Sig Dispense Refill   • Pantoprazole Sodium 40 MG Oral Tab EC Take 1 tablet (40 mg total) by mouth every morning before breakfast. 90 tablet 0   • clona Right Ear: Tympanic membrane and external ear normal. No drainage or tenderness. No mastoid tenderness. Tympanic membrane is not erythematous. Left Ear: Tympanic membrane and external ear normal. No drainage or tenderness. No mastoid tenderness.    Nose intact. No rash noted. No cyanosis or erythema. Nails show no clubbing. Psychiatric: She has a normal mood and affect. Her behavior is normal.   Vitals reviewed.            ASSESSMENT/PLAN:   Anxiety discussed with her in detail how to cope with her daily

## 2020-06-09 NOTE — PROGRESS NOTES
Tobacco Cessation Documentation (Smoking and Smokeless included): I had an in depth therapy session with Storm Priest about her tobacco use risks and options using the USPSTF's Five A's approach:    Ask: Pablo Gabriel is using tobacco products. Assess:  We Montelukast Sodium 10 MG Oral Tab Take 1 tablet (10 mg total) by mouth nightly. 30 tablet 0   • Meclizine HCl 12.5 MG Oral Tab Take 1 tablet (12.5 mg total) by mouth 3 (three) times daily as needed.  30 tablet 0   • Citalopram Hydrobromide 10 MG Oral Tab Ta

## 2020-09-15 ENCOUNTER — MED REC SCAN ONLY (OUTPATIENT)
Dept: INTERNAL MEDICINE CLINIC | Facility: CLINIC | Age: 40
End: 2020-09-15

## 2020-09-15 ENCOUNTER — OFFICE VISIT (OUTPATIENT)
Dept: INTERNAL MEDICINE CLINIC | Facility: CLINIC | Age: 40
End: 2020-09-15
Payer: COMMERCIAL

## 2020-09-15 VITALS
DIASTOLIC BLOOD PRESSURE: 72 MMHG | SYSTOLIC BLOOD PRESSURE: 107 MMHG | OXYGEN SATURATION: 99 % | HEIGHT: 67 IN | WEIGHT: 211 LBS | HEART RATE: 69 BPM | BODY MASS INDEX: 33.12 KG/M2 | TEMPERATURE: 99 F

## 2020-09-15 DIAGNOSIS — Z11.3 SCREEN FOR STD (SEXUALLY TRANSMITTED DISEASE): ICD-10-CM

## 2020-09-15 DIAGNOSIS — D50.8 OTHER IRON DEFICIENCY ANEMIA: ICD-10-CM

## 2020-09-15 DIAGNOSIS — Z00.00 ANNUAL PHYSICAL EXAM: Primary | ICD-10-CM

## 2020-09-15 DIAGNOSIS — Z12.4 SCREENING FOR CERVICAL CANCER: ICD-10-CM

## 2020-09-15 LAB
BASOPHILS # BLD AUTO: 0.04 X10(3) UL (ref 0–0.2)
BASOPHILS NFR BLD AUTO: 0.5 %
DEPRECATED HBV CORE AB SER IA-ACNC: 10.8 NG/ML (ref 12–240)
DEPRECATED RDW RBC AUTO: 47.9 FL (ref 35.1–46.3)
EOSINOPHIL # BLD AUTO: 0.45 X10(3) UL (ref 0–0.7)
EOSINOPHIL NFR BLD AUTO: 5.6 %
ERYTHROCYTE [DISTWIDTH] IN BLOOD BY AUTOMATED COUNT: 13.5 % (ref 11–15)
HBV SURFACE AG SER-ACNC: <0.1 [IU]/L
HBV SURFACE AG SERPL QL IA: NONREACTIVE
HCT VFR BLD AUTO: 40.1 % (ref 35–48)
HCV AB SERPL QL IA: NONREACTIVE
HGB BLD-MCNC: 12.8 G/DL (ref 12–16)
IMM GRANULOCYTES # BLD AUTO: 0.02 X10(3) UL (ref 0–1)
IMM GRANULOCYTES NFR BLD: 0.2 %
LYMPHOCYTES # BLD AUTO: 2.35 X10(3) UL (ref 1–4)
LYMPHOCYTES NFR BLD AUTO: 29 %
MCH RBC QN AUTO: 30.7 PG (ref 26–34)
MCHC RBC AUTO-ENTMCNC: 31.9 G/DL (ref 31–37)
MCV RBC AUTO: 96.2 FL (ref 80–100)
MONOCYTES # BLD AUTO: 0.73 X10(3) UL (ref 0.1–1)
MONOCYTES NFR BLD AUTO: 9 %
NEUTROPHILS # BLD AUTO: 4.51 X10 (3) UL (ref 1.5–7.7)
NEUTROPHILS # BLD AUTO: 4.51 X10(3) UL (ref 1.5–7.7)
NEUTROPHILS NFR BLD AUTO: 55.7 %
PLATELET # BLD AUTO: 366 10(3)UL (ref 150–450)
RBC # BLD AUTO: 4.17 X10(6)UL (ref 3.8–5.3)
WBC # BLD AUTO: 8.1 X10(3) UL (ref 4–11)

## 2020-09-15 PROCEDURE — 36415 COLL VENOUS BLD VENIPUNCTURE: CPT | Performed by: INTERNAL MEDICINE

## 2020-09-15 PROCEDURE — 99396 PREV VISIT EST AGE 40-64: CPT | Performed by: INTERNAL MEDICINE

## 2020-09-15 PROCEDURE — 3078F DIAST BP <80 MM HG: CPT | Performed by: INTERNAL MEDICINE

## 2020-09-15 PROCEDURE — 3074F SYST BP LT 130 MM HG: CPT | Performed by: INTERNAL MEDICINE

## 2020-09-15 PROCEDURE — 3008F BODY MASS INDEX DOCD: CPT | Performed by: INTERNAL MEDICINE

## 2020-09-15 NOTE — PROGRESS NOTES
HPI:   Danielle Vick is a 35year old female who presents for a complete physical exam. S.   Her period is ok   Wt Readings from Last 3 Encounters:  09/15/20 : 211 lb (95.7 kg)  06/09/20 : 202 lb (91.6 kg)  01/22/20 : 199 lb (90.3 kg)    Body mass index REVIEW OF SYSTEMS:     Constitutional Negative Chills, fatigue and fever. ENMT Negative Hearing loss, nasal drainage, pain in/around ear, sinus pressure and sore throat. Eyes Negative Eye pain and vision changes.    Respiratory Negative Cough, dyspn rhythm. No murmurs, gallops, or rubs   Vascular Normal Pulses - Dorsalis pedis: Normal. Bruits - Carotids: Absent. Extremity Normal No edema. No varicosities. Abdomen Normal Inspection normal, BS active.  No abdominal tenderness or masses palpable   Ge

## 2020-09-16 LAB
C TRACH DNA SPEC QL NAA+PROBE: NEGATIVE
N GONORRHOEA DNA SPEC QL NAA+PROBE: NEGATIVE
T PALLIDUM AB SER QL: NEGATIVE

## 2020-09-22 LAB — HPV I/H RISK 1 DNA SPEC QL NAA+PROBE: NEGATIVE

## 2020-12-15 ENCOUNTER — APPOINTMENT (OUTPATIENT)
Dept: CT IMAGING | Facility: HOSPITAL | Age: 40
DRG: 312 | End: 2020-12-15
Attending: EMERGENCY MEDICINE
Payer: COMMERCIAL

## 2020-12-15 ENCOUNTER — HOSPITAL ENCOUNTER (OUTPATIENT)
Facility: HOSPITAL | Age: 40
Setting detail: OBSERVATION
Discharge: HOME | DRG: 312 | End: 2020-12-16
Attending: EMERGENCY MEDICINE | Admitting: INTERNAL MEDICINE
Payer: COMMERCIAL

## 2020-12-15 DIAGNOSIS — R77.8 ELEVATED TROPONIN I LEVEL: ICD-10-CM

## 2020-12-15 DIAGNOSIS — R55 SYNCOPE, NEAR: Primary | ICD-10-CM

## 2020-12-15 PROBLEM — R79.89 ELEVATED TROPONIN I LEVEL: Status: ACTIVE | Noted: 2020-12-15

## 2020-12-15 LAB
CHOLEST SERPL-MCNC: 154 MG/DL
HCT VFR BLD CALC: 37.7 %
HDLC SERPL-MCNC: 54 MG/DL
HGB BLD-MCNC: 12.2 G/DL
LDLC SERPL CALC-MCNC: 83 MG/DL
MCH RBC QN AUTO: 30.7 PG
MCHC RBC AUTO-ENTMCNC: 32.4 G/DL
MCV RBC AUTO: 94.7 FL
NONHDLC SERPL-MCNC: 100 MG/DL
PLATELET # BLD: 328 K/MCL
RBC # BLD: 3.98 10*6/UL
TRIGL SERPL-MCNC: 83 MG/DL
TSH SERPL-ACNC: 0.8 MCUNITS/ML
VLDLC SERPL CALC-MCNC: 17 MG/DL
WBC # BLD: 6.3 K/MCL

## 2020-12-15 PROCEDURE — 99222 1ST HOSP IP/OBS MODERATE 55: CPT | Performed by: INTERNAL MEDICINE

## 2020-12-15 PROCEDURE — 3074F SYST BP LT 130 MM HG: CPT | Performed by: INTERNAL MEDICINE

## 2020-12-15 PROCEDURE — 3078F DIAST BP <80 MM HG: CPT | Performed by: INTERNAL MEDICINE

## 2020-12-15 PROCEDURE — 71260 CT THORAX DX C+: CPT | Performed by: EMERGENCY MEDICINE

## 2020-12-15 PROCEDURE — 3008F BODY MASS INDEX DOCD: CPT | Performed by: INTERNAL MEDICINE

## 2020-12-15 RX ORDER — METOPROLOL TARTRATE 5 MG/5ML
5 INJECTION INTRAVENOUS SEE ADMIN INSTRUCTIONS
Status: DISCONTINUED | OUTPATIENT
Start: 2020-12-16 | End: 2020-12-16

## 2020-12-15 RX ORDER — NITROGLYCERIN 0.4 MG/1
0.4 TABLET SUBLINGUAL ONCE
Status: COMPLETED | OUTPATIENT
Start: 2020-12-16 | End: 2020-12-16

## 2020-12-15 RX ORDER — ONDANSETRON 2 MG/ML
4 INJECTION INTRAMUSCULAR; INTRAVENOUS EVERY 6 HOURS PRN
Status: DISCONTINUED | OUTPATIENT
Start: 2020-12-15 | End: 2020-12-16

## 2020-12-15 RX ORDER — METOPROLOL TARTRATE 100 MG/1
100 TABLET ORAL ONCE AS NEEDED
Status: COMPLETED | OUTPATIENT
Start: 2020-12-16 | End: 2020-12-16

## 2020-12-15 RX ORDER — DILTIAZEM HYDROCHLORIDE 5 MG/ML
5 INJECTION INTRAVENOUS SEE ADMIN INSTRUCTIONS
Status: DISCONTINUED | OUTPATIENT
Start: 2020-12-16 | End: 2020-12-16

## 2020-12-15 RX ORDER — METOPROLOL TARTRATE 100 MG/1
100 TABLET ORAL ONCE
Status: DISCONTINUED | OUTPATIENT
Start: 2020-12-16 | End: 2020-12-16

## 2020-12-15 RX ORDER — POTASSIUM CHLORIDE 20 MEQ/1
40 TABLET, EXTENDED RELEASE ORAL EVERY 4 HOURS
Status: COMPLETED | OUTPATIENT
Start: 2020-12-15 | End: 2020-12-15

## 2020-12-15 RX ORDER — METOPROLOL TARTRATE 50 MG/1
50 TABLET, FILM COATED ORAL ONCE
Status: DISCONTINUED | OUTPATIENT
Start: 2020-12-15 | End: 2020-12-16

## 2020-12-15 RX ORDER — ALPRAZOLAM 0.25 MG/1
0.25 TABLET ORAL DAILY PRN
Status: DISCONTINUED | OUTPATIENT
Start: 2020-12-15 | End: 2020-12-16

## 2020-12-15 RX ORDER — ASPIRIN 81 MG/1
324 TABLET, CHEWABLE ORAL ONCE
Status: COMPLETED | OUTPATIENT
Start: 2020-12-15 | End: 2020-12-15

## 2020-12-15 RX ORDER — METOPROLOL TARTRATE 50 MG/1
50 TABLET, FILM COATED ORAL ONCE
Status: DISCONTINUED | OUTPATIENT
Start: 2020-12-16 | End: 2020-12-16

## 2020-12-15 RX ORDER — SODIUM CHLORIDE 9 MG/ML
INJECTION, SOLUTION INTRAVENOUS CONTINUOUS
Status: DISCONTINUED | OUTPATIENT
Start: 2020-12-15 | End: 2020-12-16

## 2020-12-15 RX ORDER — ENOXAPARIN SODIUM 100 MG/ML
40 INJECTION SUBCUTANEOUS EVERY 24 HOURS
Status: DISCONTINUED | OUTPATIENT
Start: 2020-12-15 | End: 2020-12-16

## 2020-12-15 RX ORDER — METOPROLOL TARTRATE 50 MG/1
50 TABLET, FILM COATED ORAL ONCE AS NEEDED
Status: COMPLETED | OUTPATIENT
Start: 2020-12-16 | End: 2020-12-16

## 2020-12-15 RX ORDER — METOPROLOL TARTRATE 100 MG/1
100 TABLET ORAL ONCE
Status: DISCONTINUED | OUTPATIENT
Start: 2020-12-15 | End: 2020-12-16

## 2020-12-15 NOTE — CONSULTS
[facility]  Cardiology Consultation    [place] Location: [facility] EMERGENCY DEPARTMENT    12/10/1980 MRN [id number]   Consulting Date 12/15/2020 CSN [id number]   Consulti does also smoke a pack of cigarettes every 2 days and has been smoking for the past 25 years.     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 Psychiatric: Coooperative, calm.     Laboratory Data:  ECG: Sinus rhythm, no acute changes      IMPRESSIONS:  1) lightheadedness, mildly elevated troponin  2) thyroid enlargement/goiter  Per PCP  3) tobacco dependence  Encouraged cessation    REC

## 2020-12-15 NOTE — ED PROVIDER NOTES
Patient Seen in: [facility] AND [facility] Emergency Department      History   Patient presents with:  Dizziness    Stated Complaint: lightheaded    HPI    58-year-old female without significant past medical history presents with complaints of lightheadedness, n distress  Eyes: pupils equal and round no pallor or injection  ENT, Mouth: mucous membranes moist  Respiratory: there are no retractions, lungs are clear to auscultation  Cardiovascular: regular rate and rhythm  Gastrointestinal:  abdomen is soft and non t Please view results for these tests on the individual orders. TROPONIN I   RAINBOW DRAW BLUE   RAINBOW DRAW LAVENDER   RAINBOW DRAW LIGHT GREEN   RAINBOW DRAW GOLD   CBC W/ DIFFERENTIAL     EKG    Rate, intervals and axes as noted on EKG Report.   Rat

## 2020-12-15 NOTE — ED NOTES
Orders for admission, patient is aware of plan and ready to go upstairs. Any questions, please call ED SEDA Crespo  at extension [phone].    Type of COVID test sent:rapid  COVID Suspicion level: Low    Titratable drug(s) infusing:  Rate:    LOC at time of trans

## 2020-12-15 NOTE — PLAN OF CARE
A&O. Resting comfortably in bed, call light within reach. Vss. Denies any pain. No pmh/home meds per pt. Tearful/crying, reports feelings of depression/anxiety, states she does not like being alone, and recently lost her mother from [place] a few months ago.

## 2020-12-15 NOTE — ED INITIAL ASSESSMENT (HPI)
Lightheadedness, dizzy and \"faintish\" starting today about 30 minutes pta. Was working in [employer] at the time. Has a history of dizziness. Was on meclizine.

## 2020-12-16 ENCOUNTER — APPOINTMENT (OUTPATIENT)
Dept: CV DIAGNOSTICS | Facility: HOSPITAL | Age: 40
DRG: 312 | End: 2020-12-16
Attending: STUDENT IN AN ORGANIZED HEALTH CARE EDUCATION/TRAINING PROGRAM
Payer: COMMERCIAL

## 2020-12-16 ENCOUNTER — APPOINTMENT (OUTPATIENT)
Dept: CT IMAGING | Facility: HOSPITAL | Age: 40
DRG: 312 | End: 2020-12-16
Attending: INTERNAL MEDICINE
Payer: COMMERCIAL

## 2020-12-16 VITALS
TEMPERATURE: 98 F | DIASTOLIC BLOOD PRESSURE: 67 MMHG | OXYGEN SATURATION: 100 % | HEIGHT: 66 IN | SYSTOLIC BLOOD PRESSURE: 120 MMHG | BODY MASS INDEX: 33 KG/M2 | HEART RATE: 66 BPM | RESPIRATION RATE: 16 BRPM | WEIGHT: 205.31 LBS

## 2020-12-16 LAB
ANION GAP SERPL CALC-SCNC: 7 MMOL/L
BUN SERPL-MCNC: 10 MG/DL
BUN/CREAT SERPL: 12.3
CALCIUM SERPL-MCNC: 8.9 MG/DL
CHLORIDE SERPL-SCNC: 110 MMOL/L
CO2 SERPL-SCNC: 25 MMOL/L
CREAT SERPL-MCNC: 0.81 MG/DL
GLUCOSE SERPL-MCNC: 93 MG/DL
POTASSIUM SERPL-SCNC: 3.8 MMOL/L
SODIUM SERPL-SCNC: 142 MMOL/L

## 2020-12-16 PROCEDURE — 3008F BODY MASS INDEX DOCD: CPT | Performed by: INTERNAL MEDICINE

## 2020-12-16 PROCEDURE — 75574 CT ANGIO HRT W/3D IMAGE: CPT | Performed by: INTERNAL MEDICINE

## 2020-12-16 PROCEDURE — 99239 HOSP IP/OBS DSCHRG MGMT >30: CPT | Performed by: INTERNAL MEDICINE

## 2020-12-16 PROCEDURE — 3078F DIAST BP <80 MM HG: CPT | Performed by: INTERNAL MEDICINE

## 2020-12-16 PROCEDURE — 93306 TTE W/DOPPLER COMPLETE: CPT | Performed by: STUDENT IN AN ORGANIZED HEALTH CARE EDUCATION/TRAINING PROGRAM

## 2020-12-16 PROCEDURE — 3074F SYST BP LT 130 MM HG: CPT | Performed by: INTERNAL MEDICINE

## 2020-12-16 RX ORDER — METOPROLOL TARTRATE 5 MG/5ML
INJECTION INTRAVENOUS
Status: COMPLETED
Start: 2020-12-16 | End: 2020-12-16

## 2020-12-16 RX ORDER — DILTIAZEM HYDROCHLORIDE 5 MG/ML
INJECTION INTRAVENOUS
Status: DISCONTINUED
Start: 2020-12-16 | End: 2020-12-16 | Stop reason: WASHOUT

## 2020-12-16 RX ORDER — NITROGLYCERIN 0.4 MG/1
TABLET SUBLINGUAL
Status: COMPLETED
Start: 2020-12-16 | End: 2020-12-16

## 2020-12-16 NOTE — PROGRESS NOTES
· [facility]      Subjective:    Objective:  /67 (BP Location: Left arm)   Pulse 66   Temp 97.8 °F (36.6 °C) (Oral)   Resp 16   Ht 5' 6\" (1.676 m)   Wt 205 lb 4.8 oz (93.1 kg)   LMP 12/12/2020   SpO2 100%   BMI 33.14 kg/m²    Telemetry:

## 2020-12-16 NOTE — IMAGING NOTE
HX TAKEN : near syncopal episode    PT CONSENTED PRIOR TO CT ARRIVAL     CTA ORDERED BY DR. Vivian Fry  WAS PT GIVEN CTA  PREMEDS NO, IF YES  150 MG PO METOPROLOL     18 GAUGE IV STARTED AT 1000 POC TESTING COMPLETED GFR =  >60  CREATINE = 0.81    TO CT TABLE

## 2020-12-16 NOTE — PAYOR COMM NOTE
--------------  ADMISSION REVIEW     Payor: Natacha JONES [facility]  Subscriber #:  [id number]  Authorization Number: [id number]    Admit date: 12/15/20  Admit time: 6222     Patient Seen in: [facility] Emergency Department    History   Patient pres Abnormal; Notable for the following components:       Result Value    Glucose 116 (*)     Calculated Osmolality 296 (*)     All other components within normal limits   D-DIMER - Abnormal; Notable for the following components:    D-Dimer 0.56 (*)     All ot feeling ok . She denies any urinary symptoms, no pain, no frequency      Vital Signs:  Blood pressure (!) 129/93, pulse 71, temperature 98.4 °F (36.9 °C), temperature source Oral, resp.  rate 18, height 5' 6\" (1.676 m), weight 210 lb (95.3 kg), last menstr unclear monitor on telemetry , ct negative for pe       Elevated troponin I level , +  Risk factor smoking and family history- plan fot cta   multionodular goiter - check tsh , will robin us thyroid as outpatient   Elevated d dimer- ct negative for pe   Pre Michelle Hopkins RN      0.9% NaCl infusion     Date Action Dose Route User    12/15/2020 1821 New Bag (none) Intravenous Thelma Johnson RN

## 2020-12-16 NOTE — H&P
[facility]    History and Physical    Anyi Wise Patient Status:  Inpatient    12/10/1980 MRN [id number]   Location [facility] 3W/SW Attending Scott Cerrato MD   Hosp Day # 0 PCP Kelly Tobin MD     Date:  12/15/2020  Rupesh shortness of breath and stridor. Cardiovascular: Negative for chest pain, palpitations and leg swelling. Gastrointestinal: Negative.   Negative for heartburn, abdominal pain, diarrhea, constipation, blood in stool, abdominal distention and anal bleedin 12.2 12/15/2020    HCT 37.7 12/15/2020    .0 12/15/2020    CREATSERUM 0.87 12/15/2020    BUN 16 12/15/2020     12/15/2020    K 3.5 12/15/2020     12/15/2020    CO2 26.0 12/15/2020     (H) 12/15/2020    CA 9.1 12/15/2020    ALB 3.5

## 2020-12-16 NOTE — DISCHARGE SUMMARY
DISCHARGE SUMMARY     Dasia De Oliveira Patient Status:  Inpatient    12/10/1980 MRN [id number]   Location [facility] 3W/SW Attending Carmen Gandara MD   Hosp Day # 1 PCP Harvey Cabral MD     Date of Admission: 12/15/2020  Date of Discharge:  and no mass. There is no hepatosplenomegaly. There is no abdominal tenderness. There is no rebound. No hernia. Musculoskeletal: Normal range of motion. Neurological: She is alert and oriented to person, place, and time. She displays normal reflexes.  No c

## 2020-12-16 NOTE — PLAN OF CARE
A&O. CTA WDL. EF 65.  Ok to [employer] per cards, follow up o/p with Dr Ariadna Morel in 3 weeks, as well as obtain a stress test.      Problem: Patient Centered Care  Goal: Patient preferences are identified and integrated in the patient's plan of care  Description: Int

## 2020-12-17 ENCOUNTER — PATIENT OUTREACH (OUTPATIENT)
Dept: CASE MANAGEMENT | Age: 40
End: 2020-12-17

## 2020-12-17 DIAGNOSIS — Z02.9 ENCOUNTERS FOR ADMINISTRATIVE PURPOSE: ICD-10-CM

## 2020-12-17 PROCEDURE — 1111F DSCHRG MED/CURRENT MED MERGE: CPT

## 2020-12-17 NOTE — PROGRESS NOTES
Initial Post Discharge Follow Up   Discharge Date: 12/16/20  Contact Date: 12/17/2020    Consent Verification:  Assessment Completed With: Patient  HIPAA Verified?   Yes    Discharge Dx:   Syncope, near        General:   • How have you been since your [facility] was prescribed:    o Was the new medication’s purpose & side effects reviewed? yes  o Do you have any questions about your new medication?  No  • Did you  your discharge medications when you left the hospital? Yes  • May I go over your medications wi

## 2020-12-22 NOTE — PROGRESS NOTES
HPI:    Hannah Ramirez is a 38year old female here today for hospital follow up.    She was discharged from Inpatient hospital, [facility] AND [facility]  to Home   Admission Date: 12/15/20   Discharge Date: 12/16/20  Hospital Discharge Diagnoses (since 11/22/202 dilation/curettage,diagnostic; dilation/curettage,diagnostic; and tubal ligation. She family history includes Diabetes in her maternal grandmother and mother; Heart Disorder in her mother; Hypertension in her mother.    She  reports that she has been smo Constitutional: She is oriented to person, place, and time. She appears well-developed and well-nourished. HENT:   Head: Normocephalic and atraumatic. Eyes: Pupils are equal, round, and reactive to light.  Conjunctivae and EOM are normal.   Neck: Norm discharge.      Kelly Tobin MD, 12/22/2020

## 2020-12-23 NOTE — PAYOR COMM NOTE
--------------  DISCHARGE REVIEW    Payor: Rashmi JONES [employer]  Subscriber #:  [id number]  Authorization Number: [id number]    Admit date: 12/15/20  Admit time:  1702  Discharge Date: 12/16/2020  5:42 PM     Admitting Physician: Mayte Jimenez MD  Att negative for pe   Prediabetic - watch diet , avoid carbs   Back pain/spasm - flexeril as needed at night , discussed side effects   Discharge Physical Exam: Constitutional: She is oriented to person, place, and time.  She appears well-developed and well-nou 66  Resp:  [16-20] 16  BP: (042-129)/(12-29) 120/67    -----------------------------------------------------------------------------------------------  PATIENT DISCHARGE INSTRUCTIONS: See electronic chart    Jazmine Llanos MD 12/16/2020    Time spent:  30

## 2020-12-26 ENCOUNTER — LAB ENCOUNTER (OUTPATIENT)
Dept: LAB | Facility: HOSPITAL | Age: 40
End: 2020-12-26
Attending: STUDENT IN AN ORGANIZED HEALTH CARE EDUCATION/TRAINING PROGRAM
Payer: COMMERCIAL

## 2020-12-26 DIAGNOSIS — R77.8 ELEVATED TROPONIN I LEVEL: ICD-10-CM

## 2020-12-26 DIAGNOSIS — R55 SYNCOPE, NEAR: ICD-10-CM

## 2020-12-28 ENCOUNTER — TELEPHONE (OUTPATIENT)
Dept: CARDIOLOGY | Age: 40
End: 2020-12-28

## 2020-12-28 ENCOUNTER — HOSPITAL ENCOUNTER (OUTPATIENT)
Dept: NUCLEAR MEDICINE | Facility: HOSPITAL | Age: 40
Discharge: HOME | End: 2020-12-28
Attending: STUDENT IN AN ORGANIZED HEALTH CARE EDUCATION/TRAINING PROGRAM
Payer: COMMERCIAL

## 2020-12-28 ENCOUNTER — HOSPITAL ENCOUNTER (OUTPATIENT)
Dept: CV DIAGNOSTICS | Facility: HOSPITAL | Age: 40
Discharge: HOME | End: 2020-12-28
Attending: STUDENT IN AN ORGANIZED HEALTH CARE EDUCATION/TRAINING PROGRAM
Payer: COMMERCIAL

## 2020-12-28 DIAGNOSIS — R55 SYNCOPE, NEAR: ICD-10-CM

## 2020-12-28 DIAGNOSIS — R77.8 ELEVATED TROPONIN I LEVEL: ICD-10-CM

## 2020-12-28 PROCEDURE — 93018 CV STRESS TEST I&R ONLY: CPT | Performed by: STUDENT IN AN ORGANIZED HEALTH CARE EDUCATION/TRAINING PROGRAM

## 2020-12-28 PROCEDURE — 78452 HT MUSCLE IMAGE SPECT MULT: CPT | Performed by: STUDENT IN AN ORGANIZED HEALTH CARE EDUCATION/TRAINING PROGRAM

## 2020-12-28 PROCEDURE — 93016 CV STRESS TEST SUPVJ ONLY: CPT | Performed by: STUDENT IN AN ORGANIZED HEALTH CARE EDUCATION/TRAINING PROGRAM

## 2020-12-28 PROCEDURE — 93017 CV STRESS TEST TRACING ONLY: CPT | Performed by: STUDENT IN AN ORGANIZED HEALTH CARE EDUCATION/TRAINING PROGRAM

## 2020-12-29 ENCOUNTER — TELEPHONE (OUTPATIENT)
Dept: CARDIOLOGY | Age: 40
End: 2020-12-29

## 2020-12-30 RX ORDER — CITALOPRAM HYDROBROMIDE 10 MG/1
10 TABLET ORAL DAILY
COMMUNITY
Start: 2020-12-22 | End: 2020-12-31

## 2020-12-30 RX ORDER — FERROUS SULFATE 325(65) MG
325 TABLET ORAL DAILY
COMMUNITY
Start: 2020-01-21

## 2020-12-30 RX ORDER — CYCLOBENZAPRINE HCL 5 MG
5 TABLET ORAL PRN
COMMUNITY
Start: 2020-12-16

## 2020-12-31 ENCOUNTER — OFFICE VISIT (OUTPATIENT)
Dept: CARDIOLOGY | Age: 40
End: 2020-12-31

## 2020-12-31 VITALS
OXYGEN SATURATION: 100 % | SYSTOLIC BLOOD PRESSURE: 112 MMHG | BODY MASS INDEX: 32.36 KG/M2 | HEART RATE: 89 BPM | DIASTOLIC BLOOD PRESSURE: 74 MMHG | RESPIRATION RATE: 18 BRPM | HEIGHT: 67 IN | WEIGHT: 206.2 LBS

## 2020-12-31 DIAGNOSIS — R42 DIZZINESS: Primary | ICD-10-CM

## 2020-12-31 PROCEDURE — 99214 OFFICE O/P EST MOD 30 MIN: CPT | Performed by: NURSE PRACTITIONER

## 2020-12-31 SDOH — HEALTH STABILITY: MENTAL HEALTH: HOW OFTEN DO YOU HAVE A DRINK CONTAINING ALCOHOL?: MONTHLY OR LESS

## 2020-12-31 ASSESSMENT — PATIENT HEALTH QUESTIONNAIRE - PHQ9
SUM OF ALL RESPONSES TO PHQ9 QUESTIONS 1 AND 2: 0
1. LITTLE INTEREST OR PLEASURE IN DOING THINGS: NOT AT ALL
2. FEELING DOWN, DEPRESSED OR HOPELESS: NOT AT ALL
CLINICAL INTERPRETATION OF PHQ2 SCORE: NO FURTHER SCREENING NEEDED
SUM OF ALL RESPONSES TO PHQ9 QUESTIONS 1 AND 2: 0
CLINICAL INTERPRETATION OF PHQ9 SCORE: NO FURTHER SCREENING NEEDED

## 2021-01-05 ENCOUNTER — TELEPHONE (OUTPATIENT)
Dept: CARDIOLOGY | Age: 41
End: 2021-01-05

## 2021-01-14 ENCOUNTER — APPOINTMENT (OUTPATIENT)
Dept: CARDIOLOGY | Age: 41
End: 2021-01-14

## 2021-01-16 ENCOUNTER — HOSPITAL ENCOUNTER (EMERGENCY)
Facility: HOSPITAL | Age: 41
Discharge: HOME | End: 2021-01-16
Attending: EMERGENCY MEDICINE
Payer: COMMERCIAL

## 2021-01-16 VITALS
DIASTOLIC BLOOD PRESSURE: 70 MMHG | BODY MASS INDEX: 33 KG/M2 | SYSTOLIC BLOOD PRESSURE: 126 MMHG | OXYGEN SATURATION: 100 % | RESPIRATION RATE: 18 BRPM | WEIGHT: 207 LBS | TEMPERATURE: 98 F | HEART RATE: 72 BPM

## 2021-01-16 DIAGNOSIS — F41.9 ANXIETY: ICD-10-CM

## 2021-01-16 DIAGNOSIS — R55 SYNCOPE, NEAR: Primary | ICD-10-CM

## 2021-01-16 LAB
ANION GAP SERPL CALC-SCNC: 6 MMOL/L (ref 0–18)
BASOPHILS # BLD AUTO: 0.02 X10(3) UL (ref 0–0.2)
BASOPHILS NFR BLD AUTO: 0.4 %
BUN BLD-MCNC: 13 MG/DL (ref 7–18)
BUN/CREAT SERPL: 16.9 (ref 10–20)
CALCIUM BLD-MCNC: 9.2 MG/DL (ref 8.5–10.1)
CHLORIDE SERPL-SCNC: 107 MMOL/L (ref 98–112)
CO2 SERPL-SCNC: 28 MMOL/L (ref 21–32)
CREAT BLD-MCNC: 0.77 MG/DL
DEPRECATED RDW RBC AUTO: 45.6 FL (ref 35.1–46.3)
EOSINOPHIL # BLD AUTO: 0.07 X10(3) UL (ref 0–0.7)
EOSINOPHIL NFR BLD AUTO: 1.5 %
ERYTHROCYTE [DISTWIDTH] IN BLOOD BY AUTOMATED COUNT: 13.1 % (ref 11–15)
GLUCOSE BLD-MCNC: 91 MG/DL (ref 70–99)
HCT VFR BLD AUTO: 40.3 %
HGB BLD-MCNC: 12.8 G/DL
IMM GRANULOCYTES # BLD AUTO: 0.01 X10(3) UL (ref 0–1)
IMM GRANULOCYTES NFR BLD: 0.2 %
LYMPHOCYTES # BLD AUTO: 1.71 X10(3) UL (ref 1–4)
LYMPHOCYTES NFR BLD AUTO: 37.7 %
MCH RBC QN AUTO: 30.3 PG (ref 26–34)
MCHC RBC AUTO-ENTMCNC: 31.8 G/DL (ref 31–37)
MCV RBC AUTO: 95.5 FL
MONOCYTES # BLD AUTO: 0.67 X10(3) UL (ref 0.1–1)
MONOCYTES NFR BLD AUTO: 14.8 %
NEUTROPHILS # BLD AUTO: 2.06 X10 (3) UL (ref 1.5–7.7)
NEUTROPHILS # BLD AUTO: 2.06 X10(3) UL (ref 1.5–7.7)
NEUTROPHILS NFR BLD AUTO: 45.4 %
OSMOLALITY SERPL CALC.SUM OF ELEC: 292 MOSM/KG (ref 275–295)
PLATELET # BLD AUTO: 266 10(3)UL (ref 150–450)
POTASSIUM SERPL-SCNC: 3.6 MMOL/L (ref 3.5–5.1)
RBC # BLD AUTO: 4.22 X10(6)UL
SODIUM SERPL-SCNC: 141 MMOL/L (ref 136–145)
WBC # BLD AUTO: 4.5 X10(3) UL (ref 4–11)

## 2021-01-16 PROCEDURE — 80048 BASIC METABOLIC PNL TOTAL CA: CPT | Performed by: EMERGENCY MEDICINE

## 2021-01-16 PROCEDURE — 93005 ELECTROCARDIOGRAM TRACING: CPT

## 2021-01-16 PROCEDURE — 36415 COLL VENOUS BLD VENIPUNCTURE: CPT

## 2021-01-16 PROCEDURE — 85025 COMPLETE CBC W/AUTO DIFF WBC: CPT | Performed by: EMERGENCY MEDICINE

## 2021-01-16 PROCEDURE — 93010 ELECTROCARDIOGRAM REPORT: CPT | Performed by: EMERGENCY MEDICINE

## 2021-01-16 PROCEDURE — 99283 EMERGENCY DEPT VISIT LOW MDM: CPT

## 2021-01-17 NOTE — ED PROVIDER NOTES
Patient Seen in: [facility] Emergency Department    History   Patient presents with:  Lightheadedness      HPI    The patient presents to the ED after an episode of mild lightheadedness while getting out of a hot shower.   She states that symptoms noted.    Physical Exam     ED Triage Vitals [01/16/21 2044]   /90   Pulse 74   Resp 18   Temp 98.4 °F (36.9 °C)   Temp src Temporal   SpO2 100 %   O2 Device None (Room air)       All measures to prevent infection transmission during my interaction w Final result                                                 Please view results for these tests on the individual orders. CBC W/ DIFFERENTIAL     EKG    Rate, intervals and axes as noted on EKG Report.   Rate: Normal  Rhythm: Sinus Rhythm  Re appointment as soon as possible for a visit in 3 days        Medications Prescribed:  Discharge Medication List as of 1/16/2021  9:55 PM

## 2021-01-17 NOTE — ED INITIAL ASSESSMENT (HPI)
Pt states she started to feel lightheaded tonight. Pt states she was recently admitted for similar symptoms in December. States she got out of the shower tonight and felt lightheaded and anxious.  Also states she has chronic upper back pain, states she was

## 2021-01-18 ENCOUNTER — TELEPHONE (OUTPATIENT)
Dept: INTERNAL MEDICINE CLINIC | Facility: HOSPITAL | Age: 41
End: 2021-01-18

## 2021-01-18 ENCOUNTER — NURSE ONLY (OUTPATIENT)
Dept: LAB | Facility: HOSPITAL | Age: 41
End: 2021-01-18
Attending: PREVENTIVE MEDICINE
Payer: COMMERCIAL

## 2021-01-18 DIAGNOSIS — Z20.822 SUSPECTED 2019 NOVEL CORONAVIRUS INFECTION: Primary | ICD-10-CM

## 2021-01-18 DIAGNOSIS — Z20.822 SUSPECTED 2019 NOVEL CORONAVIRUS INFECTION: ICD-10-CM

## 2021-01-18 LAB — SARS-COV-2 AG RESP QL IA.RAPID: DETECTED

## 2021-01-18 PROCEDURE — 87426 SARSCOV CORONAVIRUS AG IA: CPT

## 2021-01-18 NOTE — TELEPHONE ENCOUNTER
Department:                                [] [facility]  []MARIA L   [x] [facility]    Dept Manager/Supervisor/team or clinical lead: Armando Leigh    Position:  [] MD     [] RN     [] Respiratory Therapist     [] PCT     [x] Other COOk    What shift do yo scheduled to work? 1/19/2021    Did you have close contact with someone on your unit while not wearing a mask? (e.g., during meal breaks):  Yes []   No [x]    If yes, who:   Do you share a workspace? Yes [x]   No []       If yes, with whom?    Do you have a

## 2021-01-18 NOTE — TELEPHONE ENCOUNTER
Results and RTW guidelines:    COVID RESULT GIVEN:      Test type:    [x] Rapid         []     [x] Positive   - Monitor sx and temperature    - Employee should quarantine at home for at least 10 days from sx onset, follow the CDC guidelines for beau

## 2021-01-21 ENCOUNTER — NURSE TRIAGE (OUTPATIENT)
Dept: INTERNAL MEDICINE CLINIC | Facility: CLINIC | Age: 41
End: 2021-01-21

## 2021-01-21 ENCOUNTER — VIRTUAL PHONE E/M (OUTPATIENT)
Dept: INTERNAL MEDICINE CLINIC | Facility: CLINIC | Age: 41
End: 2021-01-21
Payer: COMMERCIAL

## 2021-01-21 DIAGNOSIS — U07.1 COVID-19: ICD-10-CM

## 2021-01-21 PROCEDURE — 99213 OFFICE O/P EST LOW 20 MIN: CPT | Performed by: INTERNAL MEDICINE

## 2021-01-21 NOTE — TELEPHONE ENCOUNTER
Action Requested: Summary for Provider     []  Critical Lab, Recommendations Needed  [] Need Additional Advice  [x]   FYI    []   Need Orders  [] Need Medications Sent to Pharmacy  []  Other     SUMMARY: Patient states she tested positive for covid-19 on 1

## 2021-01-21 NOTE — PROGRESS NOTES
Virtual Telephone Check-In    Juanito Stone verbally consents to a Virtual/Telephone Check-In visit on 01/21/21. Patient has been referred to the [facility] website at [email or web address] to review the yearly Consent to Treat document.     Patient underst Tobacco comment: 6-7CIG/DAY    Alcohol use: Yes      Comment: ON OCCASIONS    Drug use: Never     Ros: + congestion, loss of taste and smell, ear pressure, no fever or chills, no cough, no shortness of breath,    Physical exam she is awake alert oriented x

## 2021-01-28 ENCOUNTER — VIRTUAL PHONE E/M (OUTPATIENT)
Dept: INTERNAL MEDICINE CLINIC | Facility: CLINIC | Age: 41
End: 2021-01-28
Payer: COMMERCIAL

## 2021-01-28 ENCOUNTER — TELEPHONE (OUTPATIENT)
Dept: INTERNAL MEDICINE CLINIC | Facility: CLINIC | Age: 41
End: 2021-01-28

## 2021-01-28 DIAGNOSIS — U07.1 COVID-19: ICD-10-CM

## 2021-01-28 PROCEDURE — 99213 OFFICE O/P EST LOW 20 MIN: CPT | Performed by: INTERNAL MEDICINE

## 2021-01-28 NOTE — PROGRESS NOTES
Virtual Telephone Check-In    Zane Bonilla verbally consents to a Virtual/Telephone Check-In visit on 01/28/21. Patient has been referred to the [facility] website at [email or web address] to review the yearly Consent to Treat document.     Patient underst Smokeless tobacco: Never Used      Tobacco comment: 6-7CIG/DAY    Alcohol use: Yes      Comment: ON OCCASIONS    Drug use: Never       Ros : Fever chills no body aches she denies any congestion headache cough on and off      Exam she is awake alert oriente

## 2021-02-07 ENCOUNTER — APPOINTMENT (OUTPATIENT)
Dept: GENERAL RADIOLOGY | Facility: HOSPITAL | Age: 41
End: 2021-02-07
Attending: EMERGENCY MEDICINE
Payer: COMMERCIAL

## 2021-02-07 ENCOUNTER — APPOINTMENT (OUTPATIENT)
Dept: CT IMAGING | Facility: HOSPITAL | Age: 41
End: 2021-02-07
Attending: EMERGENCY MEDICINE
Payer: COMMERCIAL

## 2021-02-07 ENCOUNTER — HOSPITAL ENCOUNTER (EMERGENCY)
Facility: HOSPITAL | Age: 41
Discharge: HOME | End: 2021-02-07
Attending: EMERGENCY MEDICINE
Payer: COMMERCIAL

## 2021-02-07 VITALS
WEIGHT: 200 LBS | RESPIRATION RATE: 16 BRPM | TEMPERATURE: 98 F | DIASTOLIC BLOOD PRESSURE: 76 MMHG | BODY MASS INDEX: 32 KG/M2 | OXYGEN SATURATION: 100 % | SYSTOLIC BLOOD PRESSURE: 110 MMHG | HEART RATE: 61 BPM

## 2021-02-07 DIAGNOSIS — R07.89 CHEST TIGHTNESS: Primary | ICD-10-CM

## 2021-02-07 LAB
ANION GAP SERPL CALC-SCNC: 8 MMOL/L (ref 0–18)
B-HCG UR QL: NEGATIVE
BASOPHILS # BLD AUTO: 0.04 X10(3) UL (ref 0–0.2)
BASOPHILS NFR BLD AUTO: 0.6 %
BUN BLD-MCNC: 16 MG/DL (ref 7–18)
BUN/CREAT SERPL: 21.3 (ref 10–20)
CALCIUM BLD-MCNC: 9.2 MG/DL (ref 8.5–10.1)
CHLORIDE SERPL-SCNC: 109 MMOL/L (ref 98–112)
CHOLEST SMN-MCNC: 142 MG/DL (ref ?–200)
CO2 SERPL-SCNC: 25 MMOL/L (ref 21–32)
CREAT BLD-MCNC: 0.75 MG/DL
D DIMER PPP FEU-MCNC: 0.5 UG/ML FEU (ref ?–0.5)
DEPRECATED RDW RBC AUTO: 46.5 FL (ref 35.1–46.3)
EOSINOPHIL # BLD AUTO: 0.29 X10(3) UL (ref 0–0.7)
EOSINOPHIL NFR BLD AUTO: 4.2 %
ERYTHROCYTE [DISTWIDTH] IN BLOOD BY AUTOMATED COUNT: 13.1 % (ref 11–15)
GLUCOSE BLD-MCNC: 98 MG/DL (ref 70–99)
HCT VFR BLD AUTO: 37.6 %
HDLC SERPL-MCNC: 55 MG/DL (ref 40–59)
HGB BLD-MCNC: 11.8 G/DL
IMM GRANULOCYTES # BLD AUTO: 0.01 X10(3) UL (ref 0–1)
IMM GRANULOCYTES NFR BLD: 0.1 %
LDLC SERPL CALC-MCNC: 78 MG/DL (ref ?–100)
LYMPHOCYTES # BLD AUTO: 2.13 X10(3) UL (ref 1–4)
LYMPHOCYTES NFR BLD AUTO: 31.1 %
MCH RBC QN AUTO: 29.9 PG (ref 26–34)
MCHC RBC AUTO-ENTMCNC: 31.4 G/DL (ref 31–37)
MCV RBC AUTO: 95.4 FL
MONOCYTES # BLD AUTO: 0.62 X10(3) UL (ref 0.1–1)
MONOCYTES NFR BLD AUTO: 9.1 %
NEUTROPHILS # BLD AUTO: 3.76 X10 (3) UL (ref 1.5–7.7)
NEUTROPHILS # BLD AUTO: 3.76 X10(3) UL (ref 1.5–7.7)
NEUTROPHILS NFR BLD AUTO: 54.9 %
NONHDLC SERPL-MCNC: 87 MG/DL (ref ?–130)
OSMOLALITY SERPL CALC.SUM OF ELEC: 295 MOSM/KG (ref 275–295)
PLATELET # BLD AUTO: 326 10(3)UL (ref 150–450)
POTASSIUM SERPL-SCNC: 3.7 MMOL/L (ref 3.5–5.1)
RBC # BLD AUTO: 3.94 X10(6)UL
SODIUM SERPL-SCNC: 142 MMOL/L (ref 136–145)
TRIGL SERPL-MCNC: 43 MG/DL (ref 30–149)
TROPONIN I SERPL-MCNC: 0.05 NG/ML (ref ?–0.04)
TROPONIN I SERPL-MCNC: <0.045 NG/ML (ref ?–0.04)
VLDLC SERPL CALC-MCNC: 9 MG/DL (ref 0–30)
WBC # BLD AUTO: 6.9 X10(3) UL (ref 4–11)

## 2021-02-07 PROCEDURE — 85379 FIBRIN DEGRADATION QUANT: CPT | Performed by: EMERGENCY MEDICINE

## 2021-02-07 PROCEDURE — 93010 ELECTROCARDIOGRAM REPORT: CPT | Performed by: EMERGENCY MEDICINE

## 2021-02-07 PROCEDURE — 71260 CT THORAX DX C+: CPT | Performed by: EMERGENCY MEDICINE

## 2021-02-07 PROCEDURE — 85025 COMPLETE CBC W/AUTO DIFF WBC: CPT | Performed by: EMERGENCY MEDICINE

## 2021-02-07 PROCEDURE — 71045 X-RAY EXAM CHEST 1 VIEW: CPT | Performed by: EMERGENCY MEDICINE

## 2021-02-07 PROCEDURE — 99284 EMERGENCY DEPT VISIT MOD MDM: CPT

## 2021-02-07 PROCEDURE — 80061 LIPID PANEL: CPT | Performed by: EMERGENCY MEDICINE

## 2021-02-07 PROCEDURE — 36415 COLL VENOUS BLD VENIPUNCTURE: CPT

## 2021-02-07 PROCEDURE — 81025 URINE PREGNANCY TEST: CPT

## 2021-02-07 PROCEDURE — 80048 BASIC METABOLIC PNL TOTAL CA: CPT | Performed by: EMERGENCY MEDICINE

## 2021-02-07 PROCEDURE — 93005 ELECTROCARDIOGRAM TRACING: CPT

## 2021-02-07 PROCEDURE — 99244 OFF/OP CNSLTJ NEW/EST MOD 40: CPT | Performed by: INTERNAL MEDICINE

## 2021-02-07 PROCEDURE — 84484 ASSAY OF TROPONIN QUANT: CPT | Performed by: EMERGENCY MEDICINE

## 2021-02-07 RX ORDER — ASPIRIN 81 MG/1
324 TABLET, CHEWABLE ORAL ONCE
Status: COMPLETED | OUTPATIENT
Start: 2021-02-07 | End: 2021-02-07

## 2021-02-07 NOTE — ED NOTES
Discharge instructions reviewed with Galen Livingston who verbalizes understanding. Encouraged her to follow-up with cardiology or to return for new or worsening symptoms.

## 2021-02-07 NOTE — ED NOTES
Met with Logan Chavez. States she has been experiencing discomfort in her upper back but no c/o chest discomfort at this time.

## 2021-02-07 NOTE — CONSULTS
[facility] HOSP - [facility]    Report of Cardiology Consultation    Chelle Bold Patient Status:  Emergency    12/10/1980 MRN [id number]   Location [address] Attending Arthur Hernandez MD   Hosp Day # 0 PCP Fariha Harris MD coronary disease in the left main LAD or circumflex however. Distal right coronary there was some motion artifact and was not diagnostic. She then had a stress test which showed normal perfusion and normal EF.     Trop 0.052  Chest ct no pe  Stress nuc 12/2 Results:     Laboratory Data:  Lab Results   Component Value Date    WBC 6.9 02/07/2021    HGB 11.8 (L) 02/07/2021    HCT 37.6 02/07/2021    .0 02/07/2021    CREATSERUM 0.75 02/07/2021    BUN 16 02/07/2021     02/07/2021    K 3.7 02/07/202

## 2021-02-07 NOTE — ED INITIAL ASSESSMENT (HPI)
Pt states she has chest tightness this am which woke her up out of her sleep. Denies shortness of breath. + cough for 3-4 days, states she thinks she might have bronchitis which is causing the chest tightness.   +congestion

## 2021-02-07 NOTE — ED PROVIDER NOTES
Patient Seen in: [facility] AND [facility] Emergency Department      History   Patient presents with:  Chest Pain Angina    Stated Complaint: chest tightness    HPI/Subjective:   HPI    76-year-old female with history of acid reflux, recent Covid diagnosis last HPI.  Constitutional and vital signs reviewed. All other systems reviewed and negative except as noted above.     Physical Exam     ED Triage Vitals [02/07/21 0649]   BP (!) 139/94   Pulse 95   Resp 14   Temp 98 °F (36.7 °C)   Temp src Oral   SpO2 100 Range    Hold Lavender Auto Resulted    RAINBOW DRAW LIGHT GREEN    Collection Time: 02/07/21  6:54 AM   Result Value Ref Range    Hold Lt Green Auto Resulted    RAINBOW DRAW GOLD    Collection Time: 02/07/21  6:54 AM   Result Value Ref Range    Hold Gold Time: 02/07/21  6:54 AM   Result Value Ref Range    Cholesterol, Total 142 <200 mg/dL    HDL Cholesterol 55 40 - 59 mg/dL    Triglycerides 43 30 - 149 mg/dL    LDL Cholesterol 78 <100 mg/dL    VLDL 9 0 - 30 mg/dL    Non HDL Chol 87 <130 mg/dL   [facility] POCT CO I recommended decreasing current daily usage, considering nicotine replacement therapy and recommended the patient follow-up with their primary care physician to discuss medications such as Chantix and others to assist with smoking cessation.       [place] [id number]  [phone]    Schedule an appointment as soon as possible for a visit in 2 days  As needed          Medications Prescribed:  Current Discharge Medication List

## 2021-02-23 ENCOUNTER — TELEPHONE (OUTPATIENT)
Dept: INTERNAL MEDICINE CLINIC | Facility: HOSPITAL | Age: 41
End: 2021-02-23

## 2021-02-23 NOTE — TELEPHONE ENCOUNTER
Spoke with Radha Steel about getting clearance to return to work. Stated she will call [facility] today for clearance. Is returning to work.

## 2021-03-05 ENCOUNTER — HOSPITAL ENCOUNTER (OUTPATIENT)
Dept: MAMMOGRAPHY | Facility: HOSPITAL | Age: 41
Discharge: HOME | End: 2021-03-05
Attending: INTERNAL MEDICINE
Payer: COMMERCIAL

## 2021-03-05 DIAGNOSIS — Z12.31 ENCOUNTER FOR SCREENING MAMMOGRAM FOR MALIGNANT NEOPLASM OF BREAST: ICD-10-CM

## 2021-03-05 PROCEDURE — 77067 SCR MAMMO BI INCL CAD: CPT | Performed by: INTERNAL MEDICINE

## 2021-03-05 PROCEDURE — 77063 BREAST TOMOSYNTHESIS BI: CPT | Performed by: INTERNAL MEDICINE

## 2021-03-06 ENCOUNTER — APPOINTMENT (OUTPATIENT)
Dept: GENERAL RADIOLOGY | Facility: HOSPITAL | Age: 41
End: 2021-03-06
Attending: EMERGENCY MEDICINE
Payer: COMMERCIAL

## 2021-03-06 ENCOUNTER — HOSPITAL ENCOUNTER (EMERGENCY)
Facility: HOSPITAL | Age: 41
Discharge: HOME | End: 2021-03-06
Attending: EMERGENCY MEDICINE
Payer: COMMERCIAL

## 2021-03-06 VITALS
WEIGHT: 215 LBS | RESPIRATION RATE: 18 BRPM | BODY MASS INDEX: 33.74 KG/M2 | SYSTOLIC BLOOD PRESSURE: 117 MMHG | HEART RATE: 63 BPM | OXYGEN SATURATION: 99 % | TEMPERATURE: 99 F | DIASTOLIC BLOOD PRESSURE: 82 MMHG | HEIGHT: 67 IN

## 2021-03-06 DIAGNOSIS — M54.9 BACK PAIN WITHOUT RADIATION: ICD-10-CM
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
## 2021-03-06 LAB — B-HCG UR QL: NEGATIVE

## 2021-03-06 PROCEDURE — 72072 X-RAY EXAM THORAC SPINE 3VWS: CPT | Performed by: EMERGENCY MEDICINE

## 2021-03-06 PROCEDURE — 81025 URINE PREGNANCY TEST: CPT

## 2021-03-06 PROCEDURE — 99284 EMERGENCY DEPT VISIT MOD MDM: CPT

## 2021-03-06 PROCEDURE — 72100 X-RAY EXAM L-S SPINE 2/3 VWS: CPT | Performed by: EMERGENCY MEDICINE

## 2021-03-06 RX ORDER — IBUPROFEN 600 MG/1
600 TABLET ORAL EVERY 8 HOURS PRN
Qty: 30 TABLET | Refills: 0 | Status: SHIPPED | OUTPATIENT
Start: 2021-03-06 | End: 2021-03-13

## 2021-03-06 RX ORDER — TRAMADOL HYDROCHLORIDE 50 MG/1
50 TABLET ORAL EVERY 6 HOURS PRN
Qty: 10 TABLET | Refills: 0 | Status: SHIPPED | OUTPATIENT
Start: 2021-03-06 | End: 2021-03-13

## 2021-03-06 RX ORDER — IBUPROFEN 600 MG/1
600 TABLET ORAL ONCE
Status: COMPLETED | OUTPATIENT
Start: 2021-03-06 | End: 2021-03-06

## 2021-03-06 RX ORDER — CYCLOBENZAPRINE HCL 5 MG
10 TABLET ORAL ONCE
Status: DISCONTINUED | OUTPATIENT
Start: 2021-03-06 | End: 2021-03-06
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
## 2021-03-06 NOTE — ED NOTES
At time of discharge, Goldy Wilson is alert, oriented 3/3, speech clear, respirations regular and nonlabored. She is able to dress herself and ambulates unassisted with steady gait.  at the bedside to drive her home.  Goldy Wilson verbalizes understanding of dis

## 2021-03-09 ENCOUNTER — OFFICE VISIT (OUTPATIENT)
Dept: INTERNAL MEDICINE CLINIC | Facility: CLINIC | Age: 41
End: 2021-03-09
Payer: COMMERCIAL

## 2021-03-09 VITALS
BODY MASS INDEX: 32.49 KG/M2 | HEART RATE: 73 BPM | WEIGHT: 207 LBS | HEIGHT: 67 IN | DIASTOLIC BLOOD PRESSURE: 67 MMHG | SYSTOLIC BLOOD PRESSURE: 110 MMHG

## 2021-03-09 DIAGNOSIS — M54.9 UPPER BACK PAIN: Primary | ICD-10-CM

## 2021-03-09 PROCEDURE — 3074F SYST BP LT 130 MM HG: CPT | Performed by: INTERNAL MEDICINE

## 2021-03-09 PROCEDURE — 3008F BODY MASS INDEX DOCD: CPT | Performed by: INTERNAL MEDICINE

## 2021-03-09 PROCEDURE — 3078F DIAST BP <80 MM HG: CPT | Performed by: INTERNAL MEDICINE

## 2021-03-09 PROCEDURE — 99214 OFFICE O/P EST MOD 30 MIN: CPT | Performed by: INTERNAL MEDICINE
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
## 2021-03-13 ENCOUNTER — APPOINTMENT (OUTPATIENT)
Dept: CT IMAGING | Facility: HOSPITAL | Age: 41
End: 2021-03-13
Attending: EMERGENCY MEDICINE
Payer: COMMERCIAL

## 2021-03-13 ENCOUNTER — HOSPITAL ENCOUNTER (EMERGENCY)
Facility: HOSPITAL | Age: 41
Discharge: HOME | End: 2021-03-14
Attending: EMERGENCY MEDICINE
Payer: COMMERCIAL
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
## 2021-03-13 DIAGNOSIS — M62.838 NECK MUSCLE SPASM: ICD-10-CM

## 2021-03-13 PROCEDURE — 99284 EMERGENCY DEPT VISIT MOD MDM: CPT

## 2021-03-13 PROCEDURE — 70450 CT HEAD/BRAIN W/O DYE: CPT | Performed by: EMERGENCY MEDICINE

## 2021-03-13 RX ORDER — CYCLOBENZAPRINE HCL 10 MG
10 TABLET ORAL 3 TIMES DAILY PRN
Qty: 20 TABLET | Refills: 0 | Status: SHIPPED | OUTPATIENT
Start: 2021-03-13 | End: 2021-03-20

## 2021-03-13 RX ORDER — LIDOCAINE 50 MG/G
1 PATCH TOPICAL EVERY 24 HOURS
Qty: 7 PATCH | Refills: 0 | Status: SHIPPED | OUTPATIENT
Start: 2021-03-13 | End: 2021-03-20

## 2021-03-13 RX ORDER — MELOXICAM 7.5 MG/1
7.5 TABLET ORAL DAILY PRN
Qty: 14 TABLET | Refills: 0 | Status: SHIPPED | OUTPATIENT
Start: 2021-03-13 | End: 2021-03-26

## 2021-03-13 RX ORDER — CYCLOBENZAPRINE HCL 10 MG
10 TABLET ORAL ONCE
Status: COMPLETED | OUTPATIENT
Start: 2021-03-13 | End: 2021-03-14

## 2021-03-14 ENCOUNTER — APPOINTMENT (OUTPATIENT)
Dept: CT IMAGING | Facility: HOSPITAL | Age: 41
End: 2021-03-14
Attending: EMERGENCY MEDICINE
Payer: COMMERCIAL

## 2021-03-14 VITALS
OXYGEN SATURATION: 100 % | RESPIRATION RATE: 16 BRPM | HEIGHT: 67 IN | DIASTOLIC BLOOD PRESSURE: 80 MMHG | SYSTOLIC BLOOD PRESSURE: 136 MMHG | TEMPERATURE: 98 F | WEIGHT: 207 LBS | BODY MASS INDEX: 32.49 KG/M2 | HEART RATE: 80 BPM

## 2021-03-14 PROCEDURE — 72125 CT NECK SPINE W/O DYE: CPT | Performed by: EMERGENCY MEDICINE

## 2021-03-14 NOTE — ED PROVIDER NOTES
Patient Seen in: [facility] AND [facility] Emergency Department    History   No chief complaint on file.     Stated Complaint: headache     HPI    39 yo F without PMH presenting for evaluation of several days of left lateral head/neck pain and pressure noting neck Never      Review of Systems :  Constitutional: As per HPI  Gastrointestinal: Negative for vomiting and abdominal pain. Genitourinary: Negative for dysuria and hematuria. Musculoskeletal: Negative for joint swelling and arthralgias. (+)  Neck pain.   Sk ZACHERY^LIDA ^2  YOB: 1980    Past Medical History (entered by Technologist):    Reason For Exam (entered by Technologist):  headache  Other Notes (entered by Technologist): er pod 2 rm25    Additional Information (per Vision Radiologist) CT head/c-spine nonacute, stable for discharge with symptomatic care and PCP followup.     I was wearing at minimum a facemask and eye protection throughout this encounter with handwashing performed prior and after patient evaluation without personal hand/f
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
## 2021-03-26 ENCOUNTER — OFFICE VISIT (OUTPATIENT)
Dept: INTERNAL MEDICINE CLINIC | Facility: CLINIC | Age: 41
End: 2021-03-26
Payer: COMMERCIAL

## 2021-03-26 ENCOUNTER — LAB ENCOUNTER (OUTPATIENT)
Dept: LAB | Facility: HOSPITAL | Age: 41
End: 2021-03-26
Attending: INTERNAL MEDICINE
Payer: COMMERCIAL

## 2021-03-26 VITALS
DIASTOLIC BLOOD PRESSURE: 70 MMHG | BODY MASS INDEX: 32.33 KG/M2 | SYSTOLIC BLOOD PRESSURE: 109 MMHG | HEIGHT: 67 IN | HEART RATE: 66 BPM | WEIGHT: 206 LBS

## 2021-03-26 DIAGNOSIS — R10.13 EPIGASTRIC PAIN: Primary | ICD-10-CM

## 2021-03-26 DIAGNOSIS — R73.01 IFG (IMPAIRED FASTING GLUCOSE): ICD-10-CM

## 2021-03-26 DIAGNOSIS — R10.13 EPIGASTRIC PAIN: ICD-10-CM

## 2021-03-26 LAB
EST. AVERAGE GLUCOSE BLD GHB EST-MCNC: 114 MG/DL (ref 68–126)
HBA1C MFR BLD HPLC: 5.6 % (ref ?–5.7)

## 2021-03-26 PROCEDURE — 36415 COLL VENOUS BLD VENIPUNCTURE: CPT | Performed by: INTERNAL MEDICINE

## 2021-03-26 PROCEDURE — 3008F BODY MASS INDEX DOCD: CPT | Performed by: INTERNAL MEDICINE

## 2021-03-26 PROCEDURE — 99214 OFFICE O/P EST MOD 30 MIN: CPT | Performed by: INTERNAL MEDICINE

## 2021-03-26 PROCEDURE — 3078F DIAST BP <80 MM HG: CPT | Performed by: INTERNAL MEDICINE

## 2021-03-26 PROCEDURE — 83013 H PYLORI (C-13) BREATH: CPT

## 2021-03-26 PROCEDURE — 3074F SYST BP LT 130 MM HG: CPT | Performed by: INTERNAL MEDICINE

## 2021-03-26 RX ORDER — PANTOPRAZOLE SODIUM 40 MG/1
40 TABLET, DELAYED RELEASE ORAL
Qty: 90 TABLET | Refills: 0 | Status: SHIPPED | OUTPATIENT
Start: 2021-03-26 | End: 2021-10-01

## 2021-03-26 NOTE — PROGRESS NOTES
HPI/Subjective:     Patient ID: Viry Hylton is a 38year old female. HPI she came in today for follow-up on her HbA1c had also complaining of acid reflux.     Acid reflux -according to her she had same issue before she used to take ranitidine and her • Pantoprazole Sodium (PROTONIX) 40 MG Oral Tab EC Take 1 tablet (40 mg total) by mouth every morning before breakfast. 90 tablet 0   • clonazePAM 0.5 MG Oral Tab Take 1 tablet (0.5 mg total) by mouth 2 (two) times daily as needed for Anxiety.  90 tablet Uvula midline. No oropharyngeal exudate or posterior oropharyngeal erythema. Eyes:      General: No scleral icterus. Right eye: No discharge. Left eye: No discharge.       Conjunctiva/sclera: Conjunctivae normal.      Pupils: Pupils are equ protonix  Once a day in the morning   ifg - check hba1c   Orders Placed This Encounter      Helicobacter Pylori Breath Test, Adult      Meds This Visit:  Requested Prescriptions     Signed Prescriptions Disp Refills   • Pantoprazole Sodium (PROTONIX) 40 MG

## 2021-03-26 NOTE — PATIENT INSTRUCTIONS
gerdCareful with diet. Avoid hot spicy foods and caffeine and caffinated beverages. Careful with acidic foods. Elevate head of bed. Wait 2 hrs. after eating before going to bed to allow digestion.  , check urea breath test, protonix  Once a day in the [facility]

## 2021-03-28 LAB — H. PYLORI BREATH TEST: POSITIVE

## 2021-03-29 ENCOUNTER — TELEPHONE (OUTPATIENT)
Dept: INTERNAL MEDICINE CLINIC | Facility: CLINIC | Age: 41
End: 2021-03-29

## 2021-03-29 NOTE — TELEPHONE ENCOUNTER
Patient calling reports has taken her antibiotics, for Hy Pylori and is nauseated but she did not yet take the Pantoprazole    Informed patient to take Pantoprazole  before she eats and antibiotics after she eats , to stay hydrated and try having some Prob

## 2021-04-05 ENCOUNTER — TELEPHONE (OUTPATIENT)
Dept: INTERNAL MEDICINE CLINIC | Facility: CLINIC | Age: 41
End: 2021-04-05

## 2021-04-05 NOTE — TELEPHONE ENCOUNTER
metRONIDAZOLE 500 MG Oral Tab  clarithromycin 500 MG Oral Tab      Patient is taking both medication above and she asking can she buy Monistat over the counter because she don't want a yeast infection

## 2021-04-16 ENCOUNTER — OFFICE VISIT (OUTPATIENT)
Dept: INTERNAL MEDICINE CLINIC | Facility: CLINIC | Age: 41
End: 2021-04-16
Payer: COMMERCIAL

## 2021-04-16 VITALS
HEIGHT: 67 IN | HEART RATE: 76 BPM | DIASTOLIC BLOOD PRESSURE: 68 MMHG | WEIGHT: 202 LBS | SYSTOLIC BLOOD PRESSURE: 108 MMHG | BODY MASS INDEX: 31.71 KG/M2

## 2021-04-16 DIAGNOSIS — A04.8 H. PYLORI INFECTION: Primary | ICD-10-CM

## 2021-04-16 DIAGNOSIS — R53.83 FATIGUE, UNSPECIFIED TYPE: ICD-10-CM

## 2021-04-16 PROCEDURE — 99214 OFFICE O/P EST MOD 30 MIN: CPT | Performed by: INTERNAL MEDICINE

## 2021-04-16 PROCEDURE — 36415 COLL VENOUS BLD VENIPUNCTURE: CPT | Performed by: INTERNAL MEDICINE

## 2021-04-16 PROCEDURE — 3008F BODY MASS INDEX DOCD: CPT | Performed by: INTERNAL MEDICINE

## 2021-04-16 PROCEDURE — 3074F SYST BP LT 130 MM HG: CPT | Performed by: INTERNAL MEDICINE

## 2021-04-16 PROCEDURE — 3078F DIAST BP <80 MM HG: CPT | Performed by: INTERNAL MEDICINE

## 2021-04-16 NOTE — PROGRESS NOTES
HPI/Subjective:     Patient ID: Sepideh Turcios is a 38year old female. HPI  She came in today for follow-up after she finished treatment for H. pylori. According to the patient her bloating  improved and she does not have any epigastric pain.   She is as needed for Anxiety. 90 tablet 0   • escitalopram (LEXAPRO) 10 MG Oral Tab Take 1/2 tablet (5mg total) by mouth daily for 6 days, then increase to 1 tablet (10mg total) daily.  30 tablet 0   • Ferrous Sulfate 325 (65 Fe) MG Oral Tab Take 1 tablet (325 mg Nose: Nose normal.      Right Sinus: No maxillary sinus tenderness or frontal sinus tenderness. Left Sinus: No maxillary sinus tenderness or frontal sinus tenderness. Mouth/Throat:      Mouth: Mucous membranes are not cyanotic.       Pharynx: Uvul Assessment & Plan:   h pylori-infection -  S/p treatment , will check stool for h pylori  In 2-3 weeks       Fatigue - check b12     No orders of the defined types were placed in this encounter.       Meds This Visit:  Requested Prescriptions      N

## 2021-05-15 ENCOUNTER — LAB ENCOUNTER (OUTPATIENT)
Dept: LAB | Facility: HOSPITAL | Age: 41
End: 2021-05-15
Attending: INTERNAL MEDICINE
Payer: COMMERCIAL

## 2021-05-15 DIAGNOSIS — A04.8 H. PYLORI INFECTION: ICD-10-CM

## 2021-05-15 PROCEDURE — 87338 HPYLORI STOOL AG IA: CPT

## 2021-10-01 ENCOUNTER — OFFICE VISIT (OUTPATIENT)
Dept: INTERNAL MEDICINE CLINIC | Facility: CLINIC | Age: 41
End: 2021-10-01
Payer: COMMERCIAL

## 2021-10-01 VITALS
BODY MASS INDEX: 31.23 KG/M2 | DIASTOLIC BLOOD PRESSURE: 76 MMHG | HEIGHT: 67 IN | HEART RATE: 65 BPM | WEIGHT: 199 LBS | SYSTOLIC BLOOD PRESSURE: 118 MMHG

## 2021-10-01 DIAGNOSIS — Z00.00 ANNUAL PHYSICAL EXAM: Primary | ICD-10-CM

## 2021-10-01 DIAGNOSIS — E04.9 ENLARGED THYROID: ICD-10-CM

## 2021-10-01 PROCEDURE — 3078F DIAST BP <80 MM HG: CPT | Performed by: INTERNAL MEDICINE

## 2021-10-01 PROCEDURE — 3008F BODY MASS INDEX DOCD: CPT | Performed by: INTERNAL MEDICINE

## 2021-10-01 PROCEDURE — 3074F SYST BP LT 130 MM HG: CPT | Performed by: INTERNAL MEDICINE

## 2021-10-01 PROCEDURE — 99396 PREV VISIT EST AGE 40-64: CPT | Performed by: INTERNAL MEDICINE

## 2021-10-01 NOTE — PROGRESS NOTES
HPI:   Lilly Beverly is a 38year old female who presents for a complete physical exam.   Her menstrua period is regular     Wt Readings from Last 3 Encounters:  10/01/21 : 199 lb (90.3 kg)  04/16/21 : 202 lb (91.6 kg)  03/26/21 : 206 lb (93.4 kg)    Bod Problem Relation Age of Onset   • Hypertension Mother    • Diabetes Mother    • Heart Disorder Mother    • Diabetes Maternal Grandmother       Social History:   Social History    Tobacco Use      Smoking status: Light Tobacco Smoker      Smokeless tobacc Hearing - grossly normal   Nasopharynx Normal External nose - Normal. Lips/teeth/gums - Normal. Oropharynx - clear no erythema or exudate   Neck Exam Normal Palpation - Normal. No thyromegaly or lymphadenopathy   Breast Normal Inspection, palpation, nipple

## 2021-12-02 ENCOUNTER — HOSPITAL ENCOUNTER (OUTPATIENT)
Dept: ULTRASOUND IMAGING | Facility: HOSPITAL | Age: 41
Discharge: HOME | End: 2021-12-02
Attending: INTERNAL MEDICINE
Payer: COMMERCIAL

## 2021-12-02 DIAGNOSIS — E04.9 ENLARGED THYROID: ICD-10-CM

## 2021-12-02 PROCEDURE — 76536 US EXAM OF HEAD AND NECK: CPT | Performed by: INTERNAL MEDICINE

## 2022-01-14 ENCOUNTER — NURSE TRIAGE (OUTPATIENT)
Dept: INTERNAL MEDICINE CLINIC | Facility: CLINIC | Age: 42
End: 2022-01-14

## 2022-01-14 ENCOUNTER — TELEMEDICINE (OUTPATIENT)
Dept: INTERNAL MEDICINE CLINIC | Facility: CLINIC | Age: 42
End: 2022-01-14

## 2022-01-14 DIAGNOSIS — M25.511 ACUTE PAIN OF RIGHT SHOULDER: Primary | ICD-10-CM

## 2022-01-14 PROCEDURE — 99213 OFFICE O/P EST LOW 20 MIN: CPT | Performed by: INTERNAL MEDICINE

## 2022-01-14 RX ORDER — NAPROXEN 500 MG/1
500 TABLET ORAL 2 TIMES DAILY WITH MEALS
Qty: 30 TABLET | Refills: 0 | Status: SHIPPED | OUTPATIENT
Start: 2022-01-14

## 2022-01-14 NOTE — TELEPHONE ENCOUNTER
Action Requested: Summary for Provider     []  Critical Lab, Recommendations Needed  [] Need Additional Advice  []   FYI    []   Need Orders  [] Need Medications Sent to Pharmacy  []  Other     SUMMARY:video visit- s/s x 5 days,neck pain radiating to right

## 2022-12-27 ENCOUNTER — HOSPITAL ENCOUNTER (EMERGENCY)
Facility: HOSPITAL | Age: 42
Discharge: HOME | End: 2022-12-27
Attending: EMERGENCY MEDICINE
Payer: COMMERCIAL

## 2022-12-27 VITALS
DIASTOLIC BLOOD PRESSURE: 88 MMHG | OXYGEN SATURATION: 100 % | WEIGHT: 220 LBS | TEMPERATURE: 98 F | SYSTOLIC BLOOD PRESSURE: 152 MMHG | HEIGHT: 67 IN | BODY MASS INDEX: 34.53 KG/M2 | HEART RATE: 93 BPM | RESPIRATION RATE: 20 BRPM

## 2022-12-27 DIAGNOSIS — M26.629 TMJ SYNDROME: Primary | ICD-10-CM

## 2022-12-27 PROCEDURE — 99283 EMERGENCY DEPT VISIT LOW MDM: CPT

## 2022-12-27 RX ORDER — ACETAMINOPHEN 500 MG
1000 TABLET ORAL ONCE
Status: COMPLETED | OUTPATIENT
Start: 2022-12-27 | End: 2022-12-27

## 2022-12-27 RX ORDER — NAPROXEN 500 MG/1
500 TABLET ORAL 2 TIMES DAILY PRN
Qty: 20 TABLET | Refills: 0 | Status: ON HOLD | OUTPATIENT
Start: 2022-12-27 | End: 2023-01-08

## 2022-12-27 RX ORDER — NAPROXEN 500 MG/1
500 TABLET ORAL ONCE
Status: DISCONTINUED | OUTPATIENT
Start: 2022-12-27 | End: 2022-12-27

## 2022-12-27 NOTE — ED QUICK NOTES
This RN was about to administer naproxen per MD order, patient states that doesn't do anything for her. Requesting tylenol.  This writer placed order per patient request.

## 2022-12-27 NOTE — ED INITIAL ASSESSMENT (HPI)
Feels like the right side of the jaw has been \"popping\" when she eats and swallows for the last 3months, but tonight has been much more painful and \"cramp like\"

## 2022-12-30 ENCOUNTER — OFFICE VISIT (OUTPATIENT)
Dept: OTOLARYNGOLOGY | Facility: CLINIC | Age: 42
End: 2022-12-30
Payer: COMMERCIAL

## 2022-12-30 DIAGNOSIS — M26.621 ARTHRALGIA OF RIGHT TEMPOROMANDIBULAR JOINT: Primary | ICD-10-CM

## 2022-12-30 PROCEDURE — 99203 OFFICE O/P NEW LOW 30 MIN: CPT | Performed by: SPECIALIST

## 2022-12-30 NOTE — PATIENT INSTRUCTIONS
You have a right TMJ arthralgia. As you have tried naproxen and conservative measures without resolution of your problem physical therapy was recommended.

## 2023-01-07 ENCOUNTER — HOSPITAL ENCOUNTER (OUTPATIENT)
Facility: HOSPITAL | Age: 43
Setting detail: OBSERVATION
Discharge: HOME | End: 2023-01-09
Attending: EMERGENCY MEDICINE | Admitting: INTERNAL MEDICINE
Payer: COMMERCIAL

## 2023-01-07 ENCOUNTER — APPOINTMENT (OUTPATIENT)
Dept: GENERAL RADIOLOGY | Facility: HOSPITAL | Age: 43
End: 2023-01-07
Payer: COMMERCIAL

## 2023-01-07 DIAGNOSIS — R07.9 CHEST PAIN OF UNCERTAIN ETIOLOGY: Primary | ICD-10-CM

## 2023-01-07 LAB
ANION GAP SERPL CALC-SCNC: 7 MMOL/L (ref 0–18)
B-HCG UR QL: NEGATIVE
BASOPHILS # BLD AUTO: 0.03 X10(3) UL (ref 0–0.2)
BASOPHILS NFR BLD AUTO: 0.4 %
BUN BLD-MCNC: 19 MG/DL (ref 7–18)
BUN/CREAT SERPL: 25 (ref 10–20)
CALCIUM BLD-MCNC: 9.1 MG/DL (ref 8.5–10.1)
CHLORIDE SERPL-SCNC: 109 MMOL/L (ref 98–112)
CHOLEST SERPL-MCNC: 138 MG/DL (ref ?–200)
CO2 SERPL-SCNC: 26 MMOL/L (ref 21–32)
CREAT BLD-MCNC: 0.76 MG/DL
DEPRECATED RDW RBC AUTO: 45.2 FL (ref 35.1–46.3)
EOSINOPHIL # BLD AUTO: 0.5 X10(3) UL (ref 0–0.7)
EOSINOPHIL NFR BLD AUTO: 6.8 %
ERYTHROCYTE [DISTWIDTH] IN BLOOD BY AUTOMATED COUNT: 13.2 % (ref 11–15)
GFR SERPLBLD BASED ON 1.73 SQ M-ARVRAT: 100 ML/MIN/1.73M2 (ref 60–?)
GLUCOSE BLD-MCNC: 146 MG/DL (ref 70–99)
HCT VFR BLD AUTO: 36.2 %
HDLC SERPL-MCNC: 44 MG/DL (ref 40–59)
HGB BLD-MCNC: 11.7 G/DL
IMM GRANULOCYTES # BLD AUTO: 0.02 X10(3) UL (ref 0–1)
IMM GRANULOCYTES NFR BLD: 0.3 %
LDLC SERPL CALC-MCNC: 69 MG/DL (ref ?–100)
LYMPHOCYTES # BLD AUTO: 2.59 X10(3) UL (ref 1–4)
LYMPHOCYTES NFR BLD AUTO: 35.1 %
MCH RBC QN AUTO: 30 PG (ref 26–34)
MCHC RBC AUTO-ENTMCNC: 32.3 G/DL (ref 31–37)
MCV RBC AUTO: 92.8 FL
MONOCYTES # BLD AUTO: 0.51 X10(3) UL (ref 0.1–1)
MONOCYTES NFR BLD AUTO: 6.9 %
NEUTROPHILS # BLD AUTO: 3.73 X10 (3) UL (ref 1.5–7.7)
NEUTROPHILS # BLD AUTO: 3.73 X10(3) UL (ref 1.5–7.7)
NEUTROPHILS NFR BLD AUTO: 50.5 %
NONHDLC SERPL-MCNC: 94 MG/DL (ref ?–130)
OSMOLALITY SERPL CALC.SUM OF ELEC: 299 MOSM/KG (ref 275–295)
PLATELET # BLD AUTO: 328 10(3)UL (ref 150–450)
POTASSIUM SERPL-SCNC: 3.5 MMOL/L (ref 3.5–5.1)
RBC # BLD AUTO: 3.9 X10(6)UL
SODIUM SERPL-SCNC: 142 MMOL/L (ref 136–145)
TRIGL SERPL-MCNC: 142 MG/DL (ref 30–149)
TROPONIN I HIGH SENSITIVITY: 128 NG/L
VLDLC SERPL CALC-MCNC: 22 MG/DL (ref 0–30)
WBC # BLD AUTO: 7.4 X10(3) UL (ref 4–11)

## 2023-01-07 PROCEDURE — 71045 X-RAY EXAM CHEST 1 VIEW: CPT | Performed by: EMERGENCY MEDICINE

## 2023-01-07 RX ORDER — ASPIRIN 81 MG/1
324 TABLET, CHEWABLE ORAL ONCE
Status: COMPLETED | OUTPATIENT
Start: 2023-01-07 | End: 2023-01-07

## 2023-01-08 ENCOUNTER — APPOINTMENT (OUTPATIENT)
Dept: CT IMAGING | Facility: HOSPITAL | Age: 43
End: 2023-01-08
Attending: INTERNAL MEDICINE
Payer: COMMERCIAL

## 2023-01-08 LAB
ANION GAP SERPL CALC-SCNC: 4 MMOL/L (ref 0–18)
ATRIAL RATE: 55 BPM
ATRIAL RATE: 64 BPM
BASOPHILS # BLD AUTO: 0.04 X10(3) UL (ref 0–0.2)
BASOPHILS NFR BLD AUTO: 0.6 %
BUN BLD-MCNC: 15 MG/DL (ref 7–18)
BUN/CREAT SERPL: 22.4 (ref 10–20)
CALCIUM BLD-MCNC: 8.9 MG/DL (ref 8.5–10.1)
CHLORIDE SERPL-SCNC: 109 MMOL/L (ref 98–112)
CO2 SERPL-SCNC: 26 MMOL/L (ref 21–32)
CREAT BLD-MCNC: 0.67 MG/DL
D DIMER PPP FEU-MCNC: 0.64 UG/ML FEU (ref ?–0.5)
DEPRECATED RDW RBC AUTO: 46.5 FL (ref 35.1–46.3)
EOSINOPHIL # BLD AUTO: 0.38 X10(3) UL (ref 0–0.7)
EOSINOPHIL NFR BLD AUTO: 6 %
ERYTHROCYTE [DISTWIDTH] IN BLOOD BY AUTOMATED COUNT: 13.3 % (ref 11–15)
EST. AVERAGE GLUCOSE BLD GHB EST-MCNC: 120 MG/DL (ref 68–126)
GFR SERPLBLD BASED ON 1.73 SQ M-ARVRAT: 111 ML/MIN/1.73M2 (ref 60–?)
GLUCOSE BLD-MCNC: 98 MG/DL (ref 70–99)
GLUCOSE BLDC GLUCOMTR-MCNC: 113 MG/DL (ref 70–99)
HBA1C MFR BLD: 5.8 % (ref ?–5.7)
HCT VFR BLD AUTO: 37.7 %
HGB BLD-MCNC: 11.9 G/DL
IMM GRANULOCYTES # BLD AUTO: 0.01 X10(3) UL (ref 0–1)
IMM GRANULOCYTES NFR BLD: 0.2 %
LYMPHOCYTES # BLD AUTO: 2.23 X10(3) UL (ref 1–4)
LYMPHOCYTES NFR BLD AUTO: 35.1 %
MAGNESIUM SERPL-MCNC: 2.1 MG/DL (ref 1.6–2.6)
MCH RBC QN AUTO: 30.1 PG (ref 26–34)
MCHC RBC AUTO-ENTMCNC: 31.6 G/DL (ref 31–37)
MCV RBC AUTO: 95.4 FL
MONOCYTES # BLD AUTO: 0.61 X10(3) UL (ref 0.1–1)
MONOCYTES NFR BLD AUTO: 9.6 %
NEUTROPHILS # BLD AUTO: 3.08 X10 (3) UL (ref 1.5–7.7)
NEUTROPHILS # BLD AUTO: 3.08 X10(3) UL (ref 1.5–7.7)
NEUTROPHILS NFR BLD AUTO: 48.5 %
OSMOLALITY SERPL CALC.SUM OF ELEC: 289 MOSM/KG (ref 275–295)
P AXIS: 27 DEGREES
P AXIS: 38 DEGREES
P-R INTERVAL: 166 MS
P-R INTERVAL: 168 MS
PLATELET # BLD AUTO: 326 10(3)UL (ref 150–450)
POTASSIUM SERPL-SCNC: 3.8 MMOL/L (ref 3.5–5.1)
Q-T INTERVAL: 402 MS
Q-T INTERVAL: 442 MS
QRS DURATION: 88 MS
QRS DURATION: 90 MS
QTC CALCULATION (BEZET): 414 MS
QTC CALCULATION (BEZET): 422 MS
R AXIS: 30 DEGREES
R AXIS: 37 DEGREES
RBC # BLD AUTO: 3.95 X10(6)UL
SARS-COV-2 RNA RESP QL NAA+PROBE: NOT DETECTED
SODIUM SERPL-SCNC: 139 MMOL/L (ref 136–145)
T AXIS: 42 DEGREES
T AXIS: 46 DEGREES
TROPONIN I HIGH SENSITIVITY: 126 NG/L
TROPONIN I HIGH SENSITIVITY: 127 NG/L
TROPONIN I HIGH SENSITIVITY: 136 NG/L
VENTRICULAR RATE: 55 BPM
VENTRICULAR RATE: 64 BPM
WBC # BLD AUTO: 6.4 X10(3) UL (ref 4–11)

## 2023-01-08 PROCEDURE — 71260 CT THORAX DX C+: CPT | Performed by: INTERNAL MEDICINE

## 2023-01-08 PROCEDURE — 99222 1ST HOSP IP/OBS MODERATE 55: CPT | Performed by: INTERNAL MEDICINE

## 2023-01-08 RX ORDER — ASPIRIN 81 MG/1
81 TABLET ORAL DAILY
Status: DISCONTINUED | OUTPATIENT
Start: 2023-01-08 | End: 2023-01-09

## 2023-01-08 RX ORDER — ACETAMINOPHEN 500 MG
1000 TABLET ORAL EVERY 6 HOURS PRN
COMMUNITY
End: 2023-01-09

## 2023-01-08 RX ORDER — POTASSIUM CHLORIDE 20 MEQ/1
40 TABLET, EXTENDED RELEASE ORAL ONCE
Status: COMPLETED | OUTPATIENT
Start: 2023-01-08 | End: 2023-01-08

## 2023-01-08 RX ORDER — METOPROLOL TARTRATE 50 MG/1
50 TABLET, FILM COATED ORAL ONCE AS NEEDED
Status: ACTIVE | OUTPATIENT
Start: 2023-01-08 | End: 2023-01-08

## 2023-01-08 RX ORDER — METOPROLOL TARTRATE 100 MG/1
100 TABLET ORAL ONCE
Status: COMPLETED | OUTPATIENT
Start: 2023-01-09 | End: 2023-01-09

## 2023-01-08 RX ORDER — METOPROLOL TARTRATE 50 MG/1
50 TABLET, FILM COATED ORAL ONCE
Status: COMPLETED | OUTPATIENT
Start: 2023-01-09 | End: 2023-01-09

## 2023-01-08 RX ORDER — METOPROLOL TARTRATE 50 MG/1
50 TABLET, FILM COATED ORAL ONCE AS NEEDED
Status: DISCONTINUED | OUTPATIENT
Start: 2023-01-08 | End: 2023-01-09

## 2023-01-08 RX ORDER — ACETAMINOPHEN 500 MG
500 TABLET ORAL EVERY 6 HOURS PRN
Status: DISCONTINUED | OUTPATIENT
Start: 2023-01-08 | End: 2023-01-09

## 2023-01-08 RX ORDER — PANTOPRAZOLE SODIUM 40 MG/1
40 TABLET, DELAYED RELEASE ORAL
Status: DISCONTINUED | OUTPATIENT
Start: 2023-01-08 | End: 2023-01-09

## 2023-01-08 RX ORDER — METOPROLOL TARTRATE 100 MG/1
100 TABLET ORAL ONCE AS NEEDED
Status: ACTIVE | OUTPATIENT
Start: 2023-01-08 | End: 2023-01-08

## 2023-01-08 RX ORDER — ASCORBIC ACID 500 MG
500 TABLET ORAL DAILY
COMMUNITY
End: 2023-01-09

## 2023-01-08 RX ORDER — METOPROLOL TARTRATE 100 MG/1
100 TABLET ORAL ONCE AS NEEDED
Status: DISCONTINUED | OUTPATIENT
Start: 2023-01-08 | End: 2023-01-09

## 2023-01-08 RX ORDER — ENOXAPARIN SODIUM 100 MG/ML
40 INJECTION SUBCUTANEOUS DAILY
Status: DISCONTINUED | OUTPATIENT
Start: 2023-01-08 | End: 2023-01-09

## 2023-01-08 RX ORDER — METOPROLOL TARTRATE 100 MG/1
100 TABLET ORAL ONCE
Status: COMPLETED | OUTPATIENT
Start: 2023-01-08 | End: 2023-01-08

## 2023-01-08 RX ORDER — METOPROLOL TARTRATE 50 MG/1
50 TABLET, FILM COATED ORAL ONCE
Status: COMPLETED | OUTPATIENT
Start: 2023-01-08 | End: 2023-01-08

## 2023-01-08 NOTE — PLAN OF CARE
Bed locked in low position, belongings in reach, call light in reach. Frequent rounding done, all patient needs met. Non-slip socks on/at bedside. Significant other at bedside tonight. No complaints of dizziness or pain last night. Troponins staying at same range. K+ low but no protocol ordered, endorsed to day time RN. Problem: Patient Centered Care  Goal: Patient preferences are identified and integrated in the patient's plan of care  Description: Interventions:  - What would you like us to know as we care for you? I live with my partner, and I used to work here in [facility]. - Provide timely, complete, and accurate information to patient/family  - Incorporate patient and family knowledge, values, beliefs, and cultural backgrounds into the planning and delivery of care  - Encourage patient/family to participate in care and decision-making at the level they choose  - Honor patient and family perspectives and choices  Outcome: Progressing     Problem: Patient/Family Goals  Goal: Patient/Family Long Term Goal  Description: Patient's Long Term Goal: gp home feeling better and knowing if I need to see any doctors    Interventions:  - complete testing as ordered  - take medications as ordered  - know when and with whom to follow up with after DC home.   - See additional Care Plan goals for specific interventions  Outcome: Progressing  Goal: Patient/Family Short Term Goal  Description: Patient's Short Term Goal: stop this chest pain and dizziness    Interventions:   - take medications as ordered  - report any change in pain/dizziness to RN or PCT right away  - complete testing as ordered  - See additional Care Plan goals for specific interventions  Outcome: Progressing     Problem: CARDIOVASCULAR - ADULT  Goal: Maintains optimal cardiac output and hemodynamic stability  Description: INTERVENTIONS:  - Monitor vital signs, rhythm, and trends  - Monitor for bleeding, hypotension and signs of decreased cardiac output  - Evaluate effectiveness of vasoactive medications to optimize hemodynamic stability  - Monitor arterial and/or venous puncture sites for bleeding and/or hematoma  - Assess quality of pulses, skin color and temperature  - Assess for signs of decreased coronary artery perfusion - ex.  Angina  - Evaluate fluid balance, assess for edema, trend weights  Outcome: Progressing  Goal: Absence of cardiac arrhythmias or at baseline  Description: INTERVENTIONS:  - Continuous cardiac monitoring, monitor vital signs, obtain 12 lead EKG if indicated  - Evaluate effectiveness of antiarrhythmic and heart rate control medications as ordered  - Initiate emergency measures for life threatening arrhythmias  - Monitor electrolytes and administer replacement therapy as ordered  Outcome: Progressing     Problem: METABOLIC/FLUID AND ELECTROLYTES - ADULT  Goal: Glucose maintained within prescribed range  Description: INTERVENTIONS:  - Monitor Blood Glucose as ordered  - Assess for signs and symptoms of hyperglycemia and hypoglycemia  - Administer ordered medications to maintain glucose within target range  - Assess barriers to adequate nutritional intake and initiate nutrition consult as needed  - Instruct patient on self management of diabetes  Outcome: Progressing  Goal: Electrolytes maintained within normal limits  Description: INTERVENTIONS:  - Monitor labs and rhythm and assess patient for signs and symptoms of electrolyte imbalances  - Administer electrolyte replacement as ordered  - Monitor response to electrolyte replacements, including rhythm and repeat lab results as appropriate  - Fluid restriction as ordered  - Instruct patient on fluid and nutrition restrictions as appropriate  Outcome: Progressing

## 2023-01-08 NOTE — ED QUICK NOTES
Orders for admission, patient is aware of plan and ready to go upstairs.  Any questions, please call ED RN loyd at extension [phone]    Patient Covid vaccination status: Fully vaccinated     COVID Test Ordered in ED: Rapid SARS-CoV-2 by PCR    COVID Suspicion at Admission: N/A    Running Infusions:  None    Mental Status/LOC at time of transport: alert    Other pertinent information:   CIWA score: N/A   NIH score:  N/A

## 2023-01-08 NOTE — ED INITIAL ASSESSMENT (HPI)
Pt presents to ED for lightheaded since Thursday and chest pain that started today. Denies shortness of breath.

## 2023-01-09 ENCOUNTER — APPOINTMENT (OUTPATIENT)
Dept: CT IMAGING | Facility: HOSPITAL | Age: 43
End: 2023-01-09
Attending: INTERNAL MEDICINE
Payer: COMMERCIAL

## 2023-01-09 ENCOUNTER — APPOINTMENT (OUTPATIENT)
Dept: CV DIAGNOSTICS | Facility: HOSPITAL | Age: 43
End: 2023-01-09
Payer: COMMERCIAL

## 2023-01-09 VITALS
BODY MASS INDEX: 35.94 KG/M2 | HEIGHT: 67 IN | WEIGHT: 229 LBS | OXYGEN SATURATION: 98 % | DIASTOLIC BLOOD PRESSURE: 79 MMHG | TEMPERATURE: 100 F | HEART RATE: 60 BPM | SYSTOLIC BLOOD PRESSURE: 114 MMHG | RESPIRATION RATE: 16 BRPM

## 2023-01-09 LAB — POTASSIUM SERPL-SCNC: 3.9 MMOL/L (ref 3.5–5.1)

## 2023-01-09 PROCEDURE — 93306 TTE W/DOPPLER COMPLETE: CPT

## 2023-01-09 PROCEDURE — 75574 CT ANGIO HRT W/3D IMAGE: CPT | Performed by: INTERNAL MEDICINE

## 2023-01-09 PROCEDURE — 99238 HOSP IP/OBS DSCHRG MGMT 30/<: CPT | Performed by: INTERNAL MEDICINE

## 2023-01-09 RX ORDER — METOPROLOL TARTRATE 5 MG/5ML
5 INJECTION INTRAVENOUS SEE ADMIN INSTRUCTIONS
Status: DISCONTINUED | OUTPATIENT
Start: 2023-01-09 | End: 2023-01-09

## 2023-01-09 RX ORDER — NITROGLYCERIN 0.4 MG/1
0.4 TABLET SUBLINGUAL ONCE
Status: COMPLETED | OUTPATIENT
Start: 2023-01-09 | End: 2023-01-09

## 2023-01-09 RX ORDER — DILTIAZEM HYDROCHLORIDE 5 MG/ML
5 INJECTION INTRAVENOUS SEE ADMIN INSTRUCTIONS
Status: DISCONTINUED | OUTPATIENT
Start: 2023-01-09 | End: 2023-01-09

## 2023-01-09 NOTE — PLAN OF CARE
Patient has been medically cleared for discharge home with a 30 day monitor that she will  right after discharge. IV and tele box removed. Problem: Patient Centered Care  Goal: Patient preferences are identified and integrated in the patient's plan of care  Description: Interventions:  - What would you like us to know as we care for you? I live with my partner, and I used to work here in [facility] [employer]. - Provide timely, complete, and accurate information to patient/family  - Incorporate patient and family knowledge, values, beliefs, and cultural backgrounds into the planning and delivery of care  - Encourage patient/family to participate in care and decision-making at the level they choose  - Honor patient and family perspectives and choices  Outcome: Progressing     Problem: Patient/Family Goals  Goal: Patient/Family Long Term Goal  Description: Patient's Long Term Goal: gp home feeling better and knowing if I need to see any doctors    Interventions:  - complete testing as ordered  - take medications as ordered  - know when and with whom to follow up with after DC home.   - See additional Care Plan goals for specific interventions  Outcome: Progressing  Goal: Patient/Family Short Term Goal  Description: Patient's Short Term Goal: stop this chest pain and dizziness    Interventions:   - take medications as ordered  - report any change in pain/dizziness to RN or PCT right away  - complete testing as ordered  - See additional Care Plan goals for specific interventions  Outcome: Progressing     Problem: CARDIOVASCULAR - ADULT  Goal: Maintains optimal cardiac output and hemodynamic stability  Description: INTERVENTIONS:  - Monitor vital signs, rhythm, and trends  - Monitor for bleeding, hypotension and signs of decreased cardiac output  - Evaluate effectiveness of vasoactive medications to optimize hemodynamic stability  - Monitor arterial and/or venous puncture sites for bleeding and/or hematoma  - Assess quality of pulses, skin color and temperature  - Assess for signs of decreased coronary artery perfusion - ex.  Angina  - Evaluate fluid balance, assess for edema, trend weights  Outcome: Progressing  Goal: Absence of cardiac arrhythmias or at baseline  Description: INTERVENTIONS:  - Continuous cardiac monitoring, monitor vital signs, obtain 12 lead EKG if indicated  - Evaluate effectiveness of antiarrhythmic and heart rate control medications as ordered  - Initiate emergency measures for life threatening arrhythmias  - Monitor electrolytes and administer replacement therapy as ordered  Outcome: Progressing     Problem: METABOLIC/FLUID AND ELECTROLYTES - ADULT  Goal: Glucose maintained within prescribed range  Description: INTERVENTIONS:  - Monitor Blood Glucose as ordered  - Assess for signs and symptoms of hyperglycemia and hypoglycemia  - Administer ordered medications to maintain glucose within target range  - Assess barriers to adequate nutritional intake and initiate nutrition consult as needed  - Instruct patient on self management of diabetes  Outcome: Progressing  Goal: Electrolytes maintained within normal limits  Description: INTERVENTIONS:  - Monitor labs and rhythm and assess patient for signs and symptoms of electrolyte imbalances  - Administer electrolyte replacement as ordered  - Monitor response to electrolyte replacements, including rhythm and repeat lab results as appropriate  - Fluid restriction as ordered  - Instruct patient on fluid and nutrition restrictions as appropriate  Outcome: Progressing

## 2023-01-09 NOTE — IMAGING NOTE
TO CT AT 0920      HX TAKEN : CP AND ELEVATED TROPONIN'S    PT CONSENTED ON FLOOR     BASELINE VITAL SIGNS   HR 58  /72 BMI 35.87 /  LB    CTA ORDERED BY DR HARDY WAS PT GIVEN CTA  PREMEDS YES 50 MG IN AM    18 GAUGE IV STARTED ON FLOOR  POC TESTING COMPLETED GFR = 111   CREATINE = 0.67    HR 58 /72 METOPROLOL 5 MILLIGRAMS GIVEN IV PUSH @ 0925 SEE  PROTOCOL    HR 67 /72 METOPROLOL 5 MILLIGRAMS GIVEN IV PUSH @ 0930    HR 67 /62 METOPROLOL 5 MILLIGRAMS  GIVEN IV PUSH @ 0935    HR 67 /72 METOPROLOL 5 MILLIGRAMS  GIVEN IV PUSH @ 0940    NITROGLYCERIN 0.4 MILLIGRAMS SUBLINGUAL GIVEN AT 0929     CALCIUM SCORE NOT DONE PER AGE    INJECTION STARTED AT 0952  HR 67 DURING SCAN PROCEDURE COMPLETE     POST SCAN VS HR 53 /60 AT 0955    PT TO ECHO HOLDING AREA  VIA W/C @ 1000     AVS  PROVIDED   N/A FOR ER OR INPATIENTS

## 2023-01-09 NOTE — PLAN OF CARE
Problem: Patient Centered Care  Goal: Patient preferences are identified and integrated in the patient's plan of care  Description: Interventions:  - What would you like us to know as we care for you? I live with my partner, and I used to work here in [facility]. - Provide timely, complete, and accurate information to patient/family  - Incorporate patient and family knowledge, values, beliefs, and cultural backgrounds into the planning and delivery of care  - Encourage patient/family to participate in care and decision-making at the level they choose  - Honor patient and family perspectives and choices  Outcome: Progressing   Patient will have a CT angiogram tomorrow and 2Decho.

## 2023-01-17 ENCOUNTER — TELEPHONE (OUTPATIENT)
Dept: PHYSICAL THERAPY | Facility: HOSPITAL | Age: 43
End: 2023-01-17

## 2023-01-18 ENCOUNTER — OFFICE VISIT (OUTPATIENT)
Dept: PHYSICAL THERAPY | Facility: HOSPITAL | Age: 43
End: 2023-01-18
Attending: SPECIALIST
Payer: COMMERCIAL

## 2023-01-18 DIAGNOSIS — M26.621 ARTHRALGIA OF RIGHT TEMPOROMANDIBULAR JOINT: ICD-10-CM

## 2023-01-18 PROCEDURE — 97161 PT EVAL LOW COMPLEX 20 MIN: CPT

## 2023-01-18 PROCEDURE — 97140 MANUAL THERAPY 1/> REGIONS: CPT

## 2023-01-20 ENCOUNTER — OFFICE VISIT (OUTPATIENT)
Dept: PHYSICAL THERAPY | Facility: HOSPITAL | Age: 43
End: 2023-01-20
Attending: SPECIALIST
Payer: COMMERCIAL

## 2023-01-20 PROCEDURE — 97112 NEUROMUSCULAR REEDUCATION: CPT

## 2023-01-20 PROCEDURE — 97140 MANUAL THERAPY 1/> REGIONS: CPT

## 2023-02-03 ENCOUNTER — OFFICE VISIT (OUTPATIENT)
Dept: PHYSICAL THERAPY | Facility: HOSPITAL | Age: 43
End: 2023-02-03
Attending: SPECIALIST
Payer: COMMERCIAL

## 2023-02-03 PROCEDURE — 97112 NEUROMUSCULAR REEDUCATION: CPT

## 2023-02-03 PROCEDURE — 97140 MANUAL THERAPY 1/> REGIONS: CPT

## 2023-02-10 ENCOUNTER — APPOINTMENT (OUTPATIENT)
Dept: PHYSICAL THERAPY | Facility: HOSPITAL | Age: 43
End: 2023-02-10
Attending: SPECIALIST
Payer: COMMERCIAL

## 2023-02-14 ENCOUNTER — TELEPHONE (OUTPATIENT)
Dept: PHYSICAL THERAPY | Facility: HOSPITAL | Age: 43
End: 2023-02-14

## 2023-02-24 ENCOUNTER — OFFICE VISIT (OUTPATIENT)
Dept: PHYSICAL THERAPY | Facility: HOSPITAL | Age: 43
End: 2023-02-24
Attending: SPECIALIST
Payer: COMMERCIAL

## 2023-02-24 PROCEDURE — 97140 MANUAL THERAPY 1/> REGIONS: CPT

## 2023-02-24 PROCEDURE — 97112 NEUROMUSCULAR REEDUCATION: CPT

## 2023-03-03 ENCOUNTER — APPOINTMENT (OUTPATIENT)
Dept: PHYSICAL THERAPY | Facility: HOSPITAL | Age: 43
End: 2023-03-03
Attending: SPECIALIST
Payer: COMMERCIAL

## 2023-03-14 ENCOUNTER — TELEPHONE (OUTPATIENT)
Dept: PHYSICAL THERAPY | Facility: HOSPITAL | Age: 43
End: 2023-03-14

## 2023-03-24 ENCOUNTER — OFFICE VISIT (OUTPATIENT)
Dept: PHYSICAL THERAPY | Facility: HOSPITAL | Age: 43
End: 2023-03-24
Attending: SPECIALIST
Payer: COMMERCIAL

## 2023-03-24 PROCEDURE — 97140 MANUAL THERAPY 1/> REGIONS: CPT

## 2023-05-03 ENCOUNTER — OFFICE VISIT (OUTPATIENT)
Dept: INTERNAL MEDICINE CLINIC | Facility: CLINIC | Age: 43
End: 2023-05-03

## 2023-05-03 VITALS
OXYGEN SATURATION: 98 % | SYSTOLIC BLOOD PRESSURE: 125 MMHG | BODY MASS INDEX: 36.88 KG/M2 | HEIGHT: 67 IN | DIASTOLIC BLOOD PRESSURE: 81 MMHG | TEMPERATURE: 98 F | WEIGHT: 235 LBS | HEART RATE: 78 BPM

## 2023-05-03 DIAGNOSIS — H53.8 BLURRY VISION: ICD-10-CM

## 2023-05-03 DIAGNOSIS — Z12.31 ENCOUNTER FOR SCREENING MAMMOGRAM FOR MALIGNANT NEOPLASM OF BREAST: ICD-10-CM

## 2023-05-03 DIAGNOSIS — Z00.00 ANNUAL PHYSICAL EXAM: Primary | ICD-10-CM

## 2023-05-03 PROCEDURE — 3074F SYST BP LT 130 MM HG: CPT | Performed by: INTERNAL MEDICINE

## 2023-05-03 PROCEDURE — 3008F BODY MASS INDEX DOCD: CPT | Performed by: INTERNAL MEDICINE

## 2023-05-03 PROCEDURE — 99396 PREV VISIT EST AGE 40-64: CPT | Performed by: INTERNAL MEDICINE

## 2023-05-03 PROCEDURE — 3079F DIAST BP 80-89 MM HG: CPT | Performed by: INTERNAL MEDICINE

## 2023-05-15 ENCOUNTER — NURSE ONLY (OUTPATIENT)
Dept: INTERNAL MEDICINE CLINIC | Facility: HOSPITAL | Age: 43
End: 2023-05-15
Attending: EMERGENCY MEDICINE

## 2023-05-15 DIAGNOSIS — Z00.00 WELLNESS EXAMINATION: Primary | ICD-10-CM

## 2023-05-15 PROCEDURE — 86480 TB TEST CELL IMMUN MEASURE: CPT

## 2023-05-17 LAB
M TB IFN-G CD4+ T-CELLS BLD-ACNC: 0.04 IU/ML
M TB TUBERC IFN-G BLD QL: NEGATIVE
M TB TUBERC IGNF/MITOGEN IGNF CONTROL: >10 IU/ML
QFT TB1 AG MINUS NIL: 0.01 IU/ML
QFT TB2 AG MINUS NIL: 0.03 IU/ML

## 2023-11-10 ENCOUNTER — TELEPHONE (OUTPATIENT)
Dept: INTERNAL MEDICINE CLINIC | Facility: CLINIC | Age: 43
End: 2023-11-10

## 2023-11-10 ENCOUNTER — LAB ENCOUNTER (OUTPATIENT)
Dept: LAB | Facility: REFERENCE LAB | Age: 43
End: 2023-11-10
Attending: INTERNAL MEDICINE
Payer: COMMERCIAL

## 2023-11-10 ENCOUNTER — OFFICE VISIT (OUTPATIENT)
Dept: INTERNAL MEDICINE CLINIC | Facility: CLINIC | Age: 43
End: 2023-11-10

## 2023-11-10 VITALS
BODY MASS INDEX: 37.2 KG/M2 | HEIGHT: 67 IN | DIASTOLIC BLOOD PRESSURE: 80 MMHG | TEMPERATURE: 98 F | HEART RATE: 71 BPM | OXYGEN SATURATION: 100 % | SYSTOLIC BLOOD PRESSURE: 124 MMHG | WEIGHT: 237 LBS

## 2023-11-10 DIAGNOSIS — H53.8 BLURRY VISION: ICD-10-CM

## 2023-11-10 DIAGNOSIS — E55.9 VITAMIN D DEFICIENCY: ICD-10-CM

## 2023-11-10 DIAGNOSIS — R53.83 FATIGUE, UNSPECIFIED TYPE: ICD-10-CM

## 2023-11-10 DIAGNOSIS — R06.83 SNORING: ICD-10-CM

## 2023-11-10 DIAGNOSIS — R73.01 IFG (IMPAIRED FASTING GLUCOSE): Primary | ICD-10-CM

## 2023-11-10 DIAGNOSIS — R73.01 IFG (IMPAIRED FASTING GLUCOSE): ICD-10-CM

## 2023-11-10 LAB
EST. AVERAGE GLUCOSE BLD GHB EST-MCNC: 126 MG/DL (ref 68–126)
HBA1C MFR BLD: 6 % (ref ?–5.7)
TSI SER-ACNC: 0.86 MIU/ML (ref 0.55–4.78)
VIT B12 SERPL-MCNC: 540 PG/ML (ref 211–911)
VIT D+METAB SERPL-MCNC: 10.8 NG/ML (ref 30–100)

## 2023-11-10 PROCEDURE — 82306 VITAMIN D 25 HYDROXY: CPT

## 2023-11-10 PROCEDURE — 36415 COLL VENOUS BLD VENIPUNCTURE: CPT

## 2023-11-10 PROCEDURE — 99214 OFFICE O/P EST MOD 30 MIN: CPT | Performed by: INTERNAL MEDICINE

## 2023-11-10 PROCEDURE — 3074F SYST BP LT 130 MM HG: CPT | Performed by: INTERNAL MEDICINE

## 2023-11-10 PROCEDURE — 3079F DIAST BP 80-89 MM HG: CPT | Performed by: INTERNAL MEDICINE

## 2023-11-10 PROCEDURE — 84443 ASSAY THYROID STIM HORMONE: CPT

## 2023-11-10 PROCEDURE — 82607 VITAMIN B-12: CPT

## 2023-11-10 PROCEDURE — 3008F BODY MASS INDEX DOCD: CPT | Performed by: INTERNAL MEDICINE

## 2023-11-10 PROCEDURE — 83036 HEMOGLOBIN GLYCOSYLATED A1C: CPT

## 2023-11-10 NOTE — TELEPHONE ENCOUNTER
Spoke to Stephen at 's office they did not call the patient. They did not receive the referral and do not have the patient in the system yet. Blurry vision is an acceptable diagnosis code. Referral faxed to [phone].

## 2023-11-10 NOTE — TELEPHONE ENCOUNTER
Patient called and said she received a referral from  too see  but they did not accept because it needs to be a medical reason like glycoma or other conditions. Patient wants to know what are her next steps. Please advise.

## 2023-11-10 NOTE — PROGRESS NOTES
Subjective:     Patient ID: Gregory Song is a 36year old female. Cough        History/Other:   Today for follow-up. She states that lately she is feeling very tired when she wakes up in the morning. She also states that she did gain weight. She does snore a lot at night. She also wants to check her A1c since she is prediabetic. Last couple of days she did have some dry cough. But no associated symptoms like shortness of breath, fever, chills, sinus pressure  Also wants to check her vision. She states that lately she can see from 1 year she has to use the reading glasses  Review of Systems   Constitutional: Negative. HENT: Negative. Eyes: Negative. Respiratory:  Positive for cough. Cardiovascular: Negative. Gastrointestinal: Negative. Endocrine: Negative. Genitourinary: Negative. Musculoskeletal: Negative. Neurological: Negative. Hematological: Negative. Psychiatric/Behavioral: Negative. No current outpatient medications on file. Allergies: Allergies   Allergen Reactions    Shrimp        Past Medical History:   Diagnosis Date    Esophageal reflux     STD (female)       Past Surgical History:   Procedure Laterality Date    DILATION/CURETTAGE,DIAGNOSTIC      DILATION/CURETTAGE,DIAGNOSTIC      TUBAL LIGATION        Family History   Problem Relation Age of Onset    Hypertension Mother     Diabetes Mother     Heart Disorder Mother     Diabetes Maternal Grandmother       Social History:   Social History     Socioeconomic History    Marital status:    Tobacco Use    Smoking status: Light Smoker    Smokeless tobacco: Never    Tobacco comments:     1-2 daily   Vaping Use    Vaping Use: Never used   Substance and Sexual Activity    Alcohol use: Yes     Comment: ON OCCASIONS    Drug use: Never        Objective:   Physical Exam  Vitals and nursing note reviewed. Constitutional:       Appearance: Normal appearance.    HENT:      Head: Normocephalic and atraumatic. Cardiovascular:      Rate and Rhythm: Normal rate and regular rhythm. Pulses: Normal pulses. Heart sounds: Normal heart sounds. Pulmonary:      Effort: Pulmonary effort is normal.      Breath sounds: Normal breath sounds. Abdominal:      Palpations: Abdomen is soft. Musculoskeletal:         General: Normal range of motion. Cervical back: Normal range of motion and neck supple. Skin:     General: Skin is warm. Neurological:      Mental Status: She is alert. Mental status is at baseline. Psychiatric:         Mood and Affect: Mood normal.         Assessment & Plan:   1. IFG (impaired fasting glucose)  repeat hba1c   2. Blurry vision  referral for ophthalology   3. Fatigue, unspecified type , check b12 , tsh ,    4. Vitamin D deficiency check vitamin d    5.  Snoring / fatigue - will order sleep study       Orders Placed This Encounter   Procedures    Hemoglobin A1C [E]    Vitamin B12 [E]    TSH W Reflex To Free T4 [E]    Vitamin D [E]       Meds This Visit:  Requested Prescriptions      No prescriptions requested or ordered in this encounter       Imaging & Referrals:  OPHTHALMOLOGY - INTERNAL  [facility]  [id number] TEST

## 2024-01-04 ENCOUNTER — OFFICE VISIT (OUTPATIENT)
Dept: SLEEP CENTER | Age: 44
End: 2024-01-04
Attending: INTERNAL MEDICINE
Payer: COMMERCIAL

## 2024-01-04 DIAGNOSIS — R06.83 SNORING: ICD-10-CM

## 2024-01-04 DIAGNOSIS — R53.83 FATIGUE, UNSPECIFIED TYPE: ICD-10-CM

## 2024-01-04 PROCEDURE — 95806 SLEEP STUDY UNATT&RESP EFFT: CPT

## 2024-01-06 ENCOUNTER — HOSPITAL ENCOUNTER (EMERGENCY)
Facility: HOSPITAL | Age: 44
Discharge: HOME | End: 2024-01-06
Attending: EMERGENCY MEDICINE
Payer: COMMERCIAL

## 2024-01-06 VITALS
BODY MASS INDEX: 37 KG/M2 | DIASTOLIC BLOOD PRESSURE: 87 MMHG | SYSTOLIC BLOOD PRESSURE: 155 MMHG | RESPIRATION RATE: 18 BRPM | WEIGHT: 237 LBS | TEMPERATURE: 99 F | HEART RATE: 97 BPM | OXYGEN SATURATION: 98 %

## 2024-01-06 DIAGNOSIS — T78.40XA ALLERGIC REACTION, INITIAL ENCOUNTER: Primary | ICD-10-CM

## 2024-01-06 PROCEDURE — 99283 EMERGENCY DEPT VISIT LOW MDM: CPT

## 2024-01-06 RX ORDER — PREDNISONE 20 MG/1
40 TABLET ORAL DAILY
Qty: 8 TABLET | Refills: 0 | Status: SHIPPED | OUTPATIENT
Start: 2024-01-06 | End: 2024-01-10

## 2024-01-06 RX ORDER — PREDNISONE 20 MG/1
60 TABLET ORAL ONCE
Status: DISCONTINUED | OUTPATIENT
Start: 2024-01-06 | End: 2024-01-06

## 2024-01-06 RX ORDER — FAMOTIDINE 20 MG/1
20 TABLET, FILM COATED ORAL 2 TIMES DAILY
Status: DISCONTINUED | OUTPATIENT
Start: 2024-01-06 | End: 2024-01-06

## 2024-01-06 NOTE — ED INITIAL ASSESSMENT (HPI)
Patient complains of allergic reaction symptoms of itching to hands, feet, genitals, swelling to left neck, onset approx 10:30, took a benedryll just before 11:00. Denies shortness of breath, speaking in full swelling.

## 2024-01-06 NOTE — PROCEDURES
[facility]  Accredited by the American Academy of Sleep Medicine (AASM)    PATIENT'S NAME: PAMELA SOUZA   ATTENDING PHYSICIAN: Rosalinda Tucker MD   REFERRING PHYSICIAN: Rosalinda Tucker MD   PATIENT ACCOUNT #: [id number] LOCATION: Sleep Center   MEDICAL RECORD #: [id number] YOB: 1980   DATE OF STUDY: 01/04/2024       SLEEP STUDY REPORT    STUDY TYPE:  Home sleep test.    INDICATION:  Suspected obstructive sleep apnea (ICD-10 code G47.33), in a patient with snoring, unrefreshing sleep, an elevated body mass index, esophageal reflux, an [place] Sleepiness Scale score of 5/24, and a body mass index of 37.2.    RESULTS:  The patient underwent home sleep test with measurement of her nasal airflow, nasal air pressure, snoring, chest and abdominal wall motion, oximetry, and body position.  I have reviewed the entirety of the raw data of this study.  During the study, the total recording time is 680 minutes.  The lights-out clock time is 9:10 p.m.; the lights-on clock time is 8:31 a.m.  The apnea plus hypopnea index is 3.1 events per hour, and the supine apnea plus hypopnea index is 10.7 events per hour.  The average oxygen saturation was 99%, the lowest oxygen saturation was 94%, and the patient spent 0% of the testing with saturations 88% or less.  The average heart rate was 70 beats per minute.  The patient spent approximately 20% of the test in the supine position.    INTERPRETATION:  The data generated from this study is consistent with mild positional obstructive sleep apnea occurring in the supine position.  It is notable, however, that for the entirety of the night's sleep, the apnea plus hypopnea index was within normal limits.    RECOMMENDATIONS:    1.   Avoid the supine position.  The patient can consider sewing tennis balls into the back of the night shirt, use of a long side-lying pillow, or use of 1 of the proprietary devices to assist with side-lying positioning such as the  Rematee or the Zzoma.    2.   Weight loss.    3.   Avoid alcohol.   4.   Avoid sedating drug.  5.   Patient should not drive if at all sleepy.      Please do not hesitate to contact me if there is any question whatsoever regarding interpretation of this study.    Dictated By Wilian Pacheco MD  d: 01/05/2024 17:44:55  t: 01/05/2024 19:00:52  [facility] [id number]  [facility]/    cc: Rosalinda Tucker MD

## 2024-01-10 NOTE — ED PROVIDER NOTES
Patient Seen in: [facility] Emergency Department    History     Chief Complaint   Patient presents with    Allergic Rxn Allergies     Stated Complaint: Allergic Rxn    HPI    Pt complains of an allergic reaction that began this morning.  Exposure history not sure.   Complains of  Itchy rash, denies swelling of the tongue, lips or throat, no difficulty breathing.  Treatment prior to arrival includes benadryl.      Past Medical History:   Diagnosis Date    Esophageal reflux     STD (female)        Past Surgical History:   Procedure Laterality Date    DILATION/CURETTAGE,DIAGNOSTIC      DILATION/CURETTAGE,DIAGNOSTIC      TUBAL LIGATION              Family History   Problem Relation Age of Onset    Hypertension Mother     Diabetes Mother     Heart Disorder Mother     Diabetes Maternal Grandmother        Social History     Socioeconomic History    Marital status:    Tobacco Use    Smoking status: Light Smoker    Smokeless tobacco: Never    Tobacco comments:     1-2 daily   Vaping Use    Vaping Use: Never used   Substance and Sexual Activity    Alcohol use: Yes     Comment: ON OCCASIONS    Drug use: Never       Review of Systems    Positive for stated complaint: Allergic Rxn  Other systems are as noted in HPI.  Constitutional and vital signs reviewed.      All other systems reviewed and negative except as noted above.    PSFH elements reviewed from today and agreed except as otherwise stated in HPI.    Physical Exam     ED Triage Vitals [01/06/24 1151]   /87   Pulse 97   Resp 18   Temp 98.5 °F (36.9 °C)   Temp src Oral   SpO2 98 %   O2 Device None (Room air)       Current:/87   Pulse 97   Temp 98.5 °F (36.9 °C) (Oral)   Resp 18   Wt 107.5 kg   LMP 10/31/2023 (Approximate)   SpO2 98%   BMI 37.12 kg/m²     PULSE OX nl    GENERAL: awake alert no distress  EYES: PERRLA, EOMI,  ENT: mmm no angioedema  NECK: supple, no meningeal signs  LUNGS: no resp distress, cta bilateral  CARDIO: RRR without  murmur  GI: abd soft, ntnd,  EXTREMITIES: moves all 4 spont, no edema, from 5/5 strength  NEURO: alert, oriented x 3, no focal deficits appreciated  SKIN: eryehtmatous rash arms torso  PSYCH: calm, cooperative,          ED Course   Labs Reviewed - No data to display    MDM     .    Medical Decision Making  Problems Addressed:  Allergic reaction, initial encounter: acute illness or injury    Risk  Prescription drug management.        Disposition and Plan     Clinical Impression:  1. Allergic reaction, initial encounter        Disposition:  Discharge    Follow-up:  Rosalinda Tucker MD  [address] [facility] [address]  [phone]    Follow up        Medications Prescribed:  Discharge Medication List as of 1/6/2024 12:45 PM        START taking these medications    Details   predniSONE 20 MG Oral Tab Take 2 tablets (40 mg total) by mouth daily for 4 days., Normal, Disp-8 tablet, R-0

## 2024-01-17 ENCOUNTER — APPOINTMENT (OUTPATIENT)
Dept: CT IMAGING | Facility: HOSPITAL | Age: 44
End: 2024-01-17
Attending: EMERGENCY MEDICINE
Payer: COMMERCIAL

## 2024-01-17 ENCOUNTER — APPOINTMENT (OUTPATIENT)
Dept: GENERAL RADIOLOGY | Facility: HOSPITAL | Age: 44
End: 2024-01-17
Attending: EMERGENCY MEDICINE
Payer: COMMERCIAL

## 2024-01-17 ENCOUNTER — HOSPITAL ENCOUNTER (EMERGENCY)
Facility: HOSPITAL | Age: 44
Discharge: HOME | End: 2024-01-17
Attending: EMERGENCY MEDICINE
Payer: COMMERCIAL

## 2024-01-17 VITALS
OXYGEN SATURATION: 98 % | HEART RATE: 64 BPM | WEIGHT: 237 LBS | TEMPERATURE: 98 F | RESPIRATION RATE: 16 BRPM | DIASTOLIC BLOOD PRESSURE: 74 MMHG | SYSTOLIC BLOOD PRESSURE: 118 MMHG | HEIGHT: 67 IN | BODY MASS INDEX: 37.2 KG/M2

## 2024-01-17 DIAGNOSIS — I49.3 PVC (PREMATURE VENTRICULAR CONTRACTION): Primary | ICD-10-CM

## 2024-01-17 LAB
ALBUMIN SERPL-MCNC: 4.2 G/DL (ref 3.2–4.8)
ALBUMIN/GLOB SERPL: 1.2 {RATIO} (ref 1–2)
ALP LIVER SERPL-CCNC: 62 U/L
ALT SERPL-CCNC: 14 U/L
ANION GAP SERPL CALC-SCNC: 7 MMOL/L (ref 0–18)
AST SERPL-CCNC: 17 U/L (ref ?–34)
BASOPHILS # BLD AUTO: 0.04 X10(3) UL (ref 0–0.2)
BASOPHILS NFR BLD AUTO: 0.5 %
BILIRUB SERPL-MCNC: 0.3 MG/DL (ref 0.3–1.2)
BUN BLD-MCNC: 15 MG/DL (ref 9–23)
BUN/CREAT SERPL: 20.8 (ref 10–20)
CALCIUM BLD-MCNC: 9.3 MG/DL (ref 8.7–10.4)
CHLORIDE SERPL-SCNC: 106 MMOL/L (ref 98–112)
CHOLEST SERPL-MCNC: 143 MG/DL (ref ?–200)
CO2 SERPL-SCNC: 27 MMOL/L (ref 21–32)
CREAT BLD-MCNC: 0.72 MG/DL
D DIMER PPP FEU-MCNC: 0.59 UG/ML FEU (ref ?–0.5)
DEPRECATED RDW RBC AUTO: 46.5 FL (ref 35.1–46.3)
EGFRCR SERPLBLD CKD-EPI 2021: 106 ML/MIN/1.73M2 (ref 60–?)
EOSINOPHIL # BLD AUTO: 0.45 X10(3) UL (ref 0–0.7)
EOSINOPHIL NFR BLD AUTO: 6 %
ERYTHROCYTE [DISTWIDTH] IN BLOOD BY AUTOMATED COUNT: 13.8 % (ref 11–15)
GLOBULIN PLAS-MCNC: 3.5 G/DL (ref 2.8–4.4)
GLUCOSE BLD-MCNC: 97 MG/DL (ref 70–99)
HCT VFR BLD AUTO: 36.3 %
HDLC SERPL-MCNC: 40 MG/DL (ref 40–59)
HGB BLD-MCNC: 11.6 G/DL
IMM GRANULOCYTES # BLD AUTO: 0.02 X10(3) UL (ref 0–1)
IMM GRANULOCYTES NFR BLD: 0.3 %
LDLC SERPL CALC-MCNC: 76 MG/DL (ref ?–100)
LYMPHOCYTES # BLD AUTO: 2.76 X10(3) UL (ref 1–4)
LYMPHOCYTES NFR BLD AUTO: 36.9 %
MCH RBC QN AUTO: 29.1 PG (ref 26–34)
MCHC RBC AUTO-ENTMCNC: 32 G/DL (ref 31–37)
MCV RBC AUTO: 91 FL
MONOCYTES # BLD AUTO: 0.56 X10(3) UL (ref 0.1–1)
MONOCYTES NFR BLD AUTO: 7.5 %
NEUTROPHILS # BLD AUTO: 3.65 X10 (3) UL (ref 1.5–7.7)
NEUTROPHILS # BLD AUTO: 3.65 X10(3) UL (ref 1.5–7.7)
NEUTROPHILS NFR BLD AUTO: 48.8 %
NONHDLC SERPL-MCNC: 103 MG/DL (ref ?–130)
OSMOLALITY SERPL CALC.SUM OF ELEC: 291 MOSM/KG (ref 275–295)
PLATELET # BLD AUTO: 342 10(3)UL (ref 150–450)
POTASSIUM SERPL-SCNC: 3.6 MMOL/L (ref 3.5–5.1)
PROT SERPL-MCNC: 7.7 G/DL (ref 5.7–8.2)
RBC # BLD AUTO: 3.99 X10(6)UL
SODIUM SERPL-SCNC: 140 MMOL/L (ref 136–145)
TRIGL SERPL-MCNC: 154 MG/DL (ref 30–149)
TROPONIN I SERPL HS-MCNC: 87 NG/L
TROPONIN I SERPL HS-MCNC: 92 NG/L
TSI SER-ACNC: 0.61 MIU/ML (ref 0.55–4.78)
VLDLC SERPL CALC-MCNC: 24 MG/DL (ref 0–30)
WBC # BLD AUTO: 7.5 X10(3) UL (ref 4–11)

## 2024-01-17 PROCEDURE — 71260 CT THORAX DX C+: CPT | Performed by: EMERGENCY MEDICINE

## 2024-01-17 PROCEDURE — 85025 COMPLETE CBC W/AUTO DIFF WBC: CPT | Performed by: EMERGENCY MEDICINE

## 2024-01-17 PROCEDURE — 71045 X-RAY EXAM CHEST 1 VIEW: CPT | Performed by: EMERGENCY MEDICINE

## 2024-01-17 PROCEDURE — 36415 COLL VENOUS BLD VENIPUNCTURE: CPT

## 2024-01-17 PROCEDURE — 99285 EMERGENCY DEPT VISIT HI MDM: CPT

## 2024-01-17 PROCEDURE — 85025 COMPLETE CBC W/AUTO DIFF WBC: CPT

## 2024-01-17 PROCEDURE — 80053 COMPREHEN METABOLIC PANEL: CPT

## 2024-01-17 PROCEDURE — 84484 ASSAY OF TROPONIN QUANT: CPT | Performed by: EMERGENCY MEDICINE

## 2024-01-17 PROCEDURE — 93010 ELECTROCARDIOGRAM REPORT: CPT

## 2024-01-17 PROCEDURE — 80061 LIPID PANEL: CPT | Performed by: EMERGENCY MEDICINE

## 2024-01-17 PROCEDURE — 84443 ASSAY THYROID STIM HORMONE: CPT | Performed by: EMERGENCY MEDICINE

## 2024-01-17 PROCEDURE — 85379 FIBRIN DEGRADATION QUANT: CPT | Performed by: EMERGENCY MEDICINE

## 2024-01-17 PROCEDURE — 93005 ELECTROCARDIOGRAM TRACING: CPT

## 2024-01-17 PROCEDURE — 80053 COMPREHEN METABOLIC PANEL: CPT | Performed by: EMERGENCY MEDICINE

## 2024-01-17 NOTE — ED INITIAL ASSESSMENT (HPI)
Pt presents to ED with c/o intermittent palpitations since Saturday. Pt denies CP, RAMSES, diaphoresis or nausea.

## 2024-01-18 LAB
ATRIAL RATE: 69 BPM
ATRIAL RATE: 86 BPM
P AXIS: 28 DEGREES
P AXIS: 33 DEGREES
P-R INTERVAL: 156 MS
P-R INTERVAL: 164 MS
Q-T INTERVAL: 362 MS
Q-T INTERVAL: 398 MS
QRS DURATION: 74 MS
QRS DURATION: 78 MS
QTC CALCULATION (BEZET): 426 MS
QTC CALCULATION (BEZET): 433 MS
R AXIS: 19 DEGREES
R AXIS: 21 DEGREES
T AXIS: 33 DEGREES
T AXIS: 50 DEGREES
VENTRICULAR RATE: 69 BPM
VENTRICULAR RATE: 86 BPM

## 2024-01-18 NOTE — ED PROVIDER NOTES
Patient Seen in: [facility] Emergency Department      History     Chief Complaint   Patient presents with    Arrythmia/Palpitations     Stated Complaint: fast heart rate    Subjective:   HPI    Patient presents the emergency department describing intermittent palpitations.  She states that for the last few days every once in a while she will feel a \"rush\" or a \"pause\".  She states it lasts less than a second and then is gone.  There is no chest pain, shortness of breath, nausea or vomiting.  She states that she has never had these feelings before.  She wanted to make sure that \"everything was okay\".    Objective:   Past Medical History:   Diagnosis Date    Esophageal reflux     STD (female)               Past Surgical History:   Procedure Laterality Date    DILATION/CURETTAGE,DIAGNOSTIC      DILATION/CURETTAGE,DIAGNOSTIC      TUBAL LIGATION                  Social History     Socioeconomic History    Marital status:    Tobacco Use    Smoking status: Light Smoker    Smokeless tobacco: Never    Tobacco comments:     1-2 daily   Vaping Use    Vaping Use: Never used   Substance and Sexual Activity    Alcohol use: Yes     Comment: ON OCCASIONS    Drug use: Never              Review of Systems    Positive for stated complaint: fast heart rate  Other systems are as noted in HPI.  Constitutional and vital signs reviewed.      All other systems reviewed and negative except as noted above.    Physical Exam     ED Triage Vitals   BP 01/17/24 1613 131/79   Pulse 01/17/24 1613 90   Resp 01/17/24 1613 16   Temp 01/17/24 1613 97.6 °F (36.4 °C)   Temp src --    SpO2 01/17/24 1613 98 %   O2 Device 01/17/24 1815 None (Room air)       Current:/73   Pulse 62   Temp 97.6 °F (36.4 °C)   Resp 14   Ht 170.2 cm (5' 7\")   Wt 107.5 kg   LMP 01/16/2024   SpO2 99%   BMI 37.12 kg/m²         Physical Exam  Vitals and nursing note reviewed.   Constitutional:       General: She is not in acute distress.     Appearance:  She is well-developed.   HENT:      Head: Normocephalic.      Nose: Nose normal.      Mouth/Throat:      Mouth: Mucous membranes are moist.   Eyes:      Conjunctiva/sclera: Conjunctivae normal.   Cardiovascular:      Rate and Rhythm: Normal rate and regular rhythm.      Heart sounds: No murmur heard.  Pulmonary:      Effort: Pulmonary effort is normal. No respiratory distress.      Breath sounds: Normal breath sounds.   Abdominal:      General: There is no distension.      Palpations: Abdomen is soft.      Tenderness: There is no abdominal tenderness.   Musculoskeletal:         General: No tenderness. Normal range of motion.      Cervical back: Normal range of motion and neck supple.   Skin:     General: Skin is warm and dry.      Capillary Refill: Capillary refill takes less than 2 seconds.      Findings: No rash.   Neurological:      Mental Status: She is alert and oriented to person, place, and time.   Psychiatric:         Mood and Affect: Mood normal.               ED Course     Labs Reviewed   COMP METABOLIC PANEL (14) - Abnormal; Notable for the following components:       Result Value    BUN/CREA Ratio 20.8 (*)     All other components within normal limits   TROPONIN I HIGH SENSITIVITY - Abnormal; Notable for the following components:    Troponin I (High Sensitivity) 92 (*)     All other components within normal limits   D-DIMER - Abnormal; Notable for the following components:    D-Dimer 0.59 (*)     All other components within normal limits   LIPID PANEL - Abnormal; Notable for the following components:    Triglycerides 154 (*)     All other components within normal limits   TROPONIN I HIGH SENSITIVITY - Abnormal; Notable for the following components:    Troponin I (High Sensitivity) 87 (*)     All other components within normal limits   CBC W/ DIFFERENTIAL - Abnormal; Notable for the following components:    HGB 11.6 (*)     RDW-SD 46.5 (*)     All other components within normal limits   TSH W REFLEX TO  FREE T4 - Normal   CBC WITH DIFFERENTIAL WITH PLATELET    Narrative:     The following orders were created for panel order CBC With Differential With Platelet.  Procedure                               Abnormality         Status                     ---------                               -----------         ------                     CBC W/ DIFFERENTIAL[[id number]]          Abnormal            Final result                 Please view results for these tests on the individual orders.   RAINBOW DRAW LAVENDER   RAINBOW DRAW LIGHT GREEN   RAINBOW DRAW BLUE     EKG    Rate, intervals and axes as noted on EKG Report.  Rate: 86 bpm  Rhythm: Sinus Rhythm  Reading: Nonspecific ST changes, abnormal                 EKG    Indication: palpitations  Rhythm: NSR  Comparison: No old EKG for Comparison  ST Segment: normal  Interpretation: normal ekg           MDM                           Medical Decision Making  Differential diagnosis considered for dysrhythmia, PVCs, electrolyte disturbance, pulmonary embolism.    Problems Addressed:  PVC (premature ventricular contraction): acute illness or injury    Amount and/or Complexity of Data Reviewed  External Data Reviewed: labs, radiology, ECG and notes.     Details: Significant time was spent reviewing the visit from January 2023 during which time she had chest discomfort and elevated troponins on several different occasions all between the the levels of 110-140.  She had multiple EKGs performed as well as a CT angiogram of her heart which was normal and an echocardiogram which was reviewed as well.  Labs: ordered. Decision-making details documented in ED Course.     Details: Troponin elevated but repeat shows slight decrease in troponin.  CBC and chemistry panel otherwise normal.  Elevated D-dimer  Radiology: ordered and independent interpretation performed. Decision-making details documented in ED Course.     Details: CT of the chest shows no evidence of PE.  ECG/medicine tests:  ordered and independent interpretation performed. Decision-making details documented in ED Course.  Discussion of management or test interpretation with external provider(s): I discussed this case with Dr. Saha of cardiology.  I explained that her troponin had trended downward and she had no chest pain as well as the previous evaluation in the hospital with persistently elevated troponins of unclear significance.  He felt that in an absence of clinical presentation and lending itself to ischemia she could be discharged home and follow-up with them in the office.    I also discussed these results with the patient and she feels comfortable going home and following up as planned.        Disposition and Plan     Clinical Impression:  1. PVC (premature ventricular contraction)         Disposition:  Discharge  1/17/2024  9:36 pm    Follow-up:  Leonel Tovar MD  [facility] [address] [address]  [phone]    Schedule an appointment as soon as possible for a visit            Medications Prescribed:  Current Discharge Medication List

## 2024-01-18 NOTE — DISCHARGE INSTRUCTIONS
Your evaluation today shows that premature ventricular contractions are causing you the symptoms you have been having the last few days.  Should you develop chest pain or shortness of breath, return to the emergency department for reevaluation immediately.  Recommend you follow-up with a cardiologist who you saw before.  Drink plenty of fluids and stay well-hydrated.

## 2024-01-18 NOTE — PLAN OF CARE
Progress Note  Manju Payne Patient Status:  Emergency    12/10/1980 MRN [id number]   Location [facility] EMERGENCY DEPARTMENT Attending Arturo Stark MD   Hosp Day # 0 PCP AMNA BEE MD     Paged by Dr. Barnes, ED physician      HPI:  Patient presented to ED with c/o palpitations. Pt denied chest pain, dyspnea or any other symptoms. Work up in ED showed elevated troponin. EKG with no acute ischemic changes or arrhythmias. Previous hospitalization 2023 showed elevated troponin at that time, however, ischemic work up was unremarkable. HPI obtained from chart review and information provided by ED physician.       Assessment/Plan:    - Plan of care/disposition to be discussed between ED physician and on call cardiologist (Dr. Saha)      FREDERICK Hatch  [facility]  2024  9:55 PM

## 2024-01-19 ENCOUNTER — OFFICE VISIT (OUTPATIENT)
Dept: INTERNAL MEDICINE CLINIC | Facility: CLINIC | Age: 44
End: 2024-01-19

## 2024-01-19 VITALS
SYSTOLIC BLOOD PRESSURE: 125 MMHG | WEIGHT: 240 LBS | OXYGEN SATURATION: 100 % | BODY MASS INDEX: 37.67 KG/M2 | DIASTOLIC BLOOD PRESSURE: 75 MMHG | HEART RATE: 78 BPM | HEIGHT: 67 IN | TEMPERATURE: 98 F

## 2024-01-19 DIAGNOSIS — R00.2 PALPITATION: Primary | ICD-10-CM

## 2024-01-19 PROCEDURE — 3008F BODY MASS INDEX DOCD: CPT | Performed by: INTERNAL MEDICINE

## 2024-01-19 PROCEDURE — 99213 OFFICE O/P EST LOW 20 MIN: CPT | Performed by: INTERNAL MEDICINE

## 2024-01-19 PROCEDURE — 3078F DIAST BP <80 MM HG: CPT | Performed by: INTERNAL MEDICINE

## 2024-01-19 PROCEDURE — 3074F SYST BP LT 130 MM HG: CPT | Performed by: INTERNAL MEDICINE

## 2024-01-19 NOTE — PROGRESS NOTES
Subjective:     Patient ID: Manju Payne is a 43 year old female.    HPI    History/Other:   She came in today for follow-up from emergency room.  She went to ER because of the palpitation.  She was diagnosed with PVCs she also had elevated troponins which were downtrending.  They discussed her case with cardiology and she was advised that she needs to follow-up with cardiology outpatient.  Currently she denies any symptoms.  Review of Systems   Constitutional: Negative.    HENT: Negative.     Eyes: Negative.    Respiratory: Negative.     Cardiovascular: Negative.    Gastrointestinal: Negative.    Genitourinary: Negative.    Musculoskeletal: Negative.    Skin: Negative.    Neurological: Negative.    Hematological: Negative.    Psychiatric/Behavioral: Negative.       Current Outpatient Medications   Medication Sig Dispense Refill    ergocalciferol 1.25 MG (26297 UT) Oral Cap Take 1 capsule (50,000 Units total) by mouth once a week. (Patient not taking: Reported on 1/19/2024) 12 capsule 0     Allergies:  Allergies   Allergen Reactions    Shrimp        Past Medical History:   Diagnosis Date    Esophageal reflux     STD (female)       Past Surgical History:   Procedure Laterality Date    DILATION/CURETTAGE,DIAGNOSTIC      DILATION/CURETTAGE,DIAGNOSTIC      TUBAL LIGATION        Family History   Problem Relation Age of Onset    Hypertension Mother     Diabetes Mother     Heart Disorder Mother     Diabetes Maternal Grandmother       Social History:   Social History     Socioeconomic History    Marital status:    Tobacco Use    Smoking status: Light Smoker    Smokeless tobacco: Never    Tobacco comments:     1-2 daily   Vaping Use    Vaping Use: Never used   Substance and Sexual Activity    Alcohol use: Yes     Comment: ON OCCASIONS    Drug use: Never        Objective:   Physical Exam  Vitals and nursing note reviewed.   Constitutional:       Appearance: Normal appearance.   HENT:      Head: Normocephalic and  atraumatic.   Cardiovascular:      Rate and Rhythm: Normal rate and regular rhythm.      Pulses: Normal pulses.      Heart sounds: Normal heart sounds.   Pulmonary:      Effort: Pulmonary effort is normal.      Breath sounds: Normal breath sounds.   Abdominal:      General: Bowel sounds are normal.      Palpations: Abdomen is soft.   Musculoskeletal:         General: Normal range of motion.      Cervical back: Normal range of motion and neck supple.   Skin:     General: Skin is warm.   Neurological:      Mental Status: She is alert. Mental status is at baseline.   Psychiatric:         Mood and Affect: Mood normal.         Assessment & Plan:   1. Palpitation    Will order Holter monitor, referral for cardiology    No orders of the defined types were placed in this encounter.      Meds This Visit:  Requested Prescriptions      No prescriptions requested or ordered in this encounter       Imaging & Referrals:  CARDIO - INTERNAL  CARD MONITOR HOLTER O 8-14 DAYS (CPT=[id number])

## 2024-02-02 ENCOUNTER — HOSPITAL ENCOUNTER (OUTPATIENT)
Dept: MAMMOGRAPHY | Facility: HOSPITAL | Age: 44
Discharge: HOME | End: 2024-02-02
Attending: INTERNAL MEDICINE
Payer: COMMERCIAL

## 2024-02-02 DIAGNOSIS — Z12.31 ENCOUNTER FOR SCREENING MAMMOGRAM FOR MALIGNANT NEOPLASM OF BREAST: ICD-10-CM

## 2024-02-02 PROCEDURE — 77067 SCR MAMMO BI INCL CAD: CPT | Performed by: INTERNAL MEDICINE

## 2024-02-02 PROCEDURE — 77063 BREAST TOMOSYNTHESIS BI: CPT | Performed by: INTERNAL MEDICINE

## 2024-03-06 ENCOUNTER — OFFICE VISIT (OUTPATIENT)
Dept: INTERNAL MEDICINE CLINIC | Facility: CLINIC | Age: 44
End: 2024-03-06
Payer: COMMERCIAL

## 2024-03-06 VITALS
BODY MASS INDEX: 38.3 KG/M2 | HEIGHT: 67 IN | WEIGHT: 244 LBS | TEMPERATURE: 98 F | HEART RATE: 73 BPM | DIASTOLIC BLOOD PRESSURE: 75 MMHG | OXYGEN SATURATION: 98 % | SYSTOLIC BLOOD PRESSURE: 114 MMHG

## 2024-03-06 DIAGNOSIS — R20.0 BILATERAL HAND NUMBNESS: Primary | ICD-10-CM

## 2024-03-06 PROCEDURE — 99213 OFFICE O/P EST LOW 20 MIN: CPT | Performed by: INTERNAL MEDICINE

## 2024-03-06 RX ORDER — METHYLPREDNISOLONE 4 MG/1
TABLET ORAL
Qty: 1 EACH | Refills: 0 | Status: SHIPPED | OUTPATIENT
Start: 2024-03-06 | End: 2024-03-11 | Stop reason: ALTCHOICE

## 2024-03-06 RX ORDER — ACETAMINOPHEN 500 MG
TABLET ORAL
COMMUNITY
Start: 2023-03-15

## 2024-03-06 NOTE — PROGRESS NOTES
Subjective:     Patient ID: Manju Payne is a 43 year old female.    Numbness  Associated symptoms include numbness.       History/Other: She came in today complaining of tingling, numbness on bilateral hand.  According to her this is ongoing problem for long time but for the last 1 month is getting worse.  Is worse on her left hand and on her right hand.  She is using splints at night but is not helping.  She was seen by the hand doctor in the past had a steroid injection.  Review of Systems   Constitutional: Negative.    HENT: Negative.     Eyes: Negative.    Respiratory: Negative.     Gastrointestinal: Negative.    Endocrine: Negative.    Genitourinary: Negative.    Musculoskeletal: Negative.    Skin: Negative.    Neurological:  Positive for numbness.     Current Outpatient Medications   Medication Sig Dispense Refill    acetaminophen 500 MG Oral Tab acetaminophen 500 MG Oral Tab, [RxNorm: 248030]       Allergies:  Allergies   Allergen Reactions    Shrimp        Past Medical History:   Diagnosis Date    Esophageal reflux     STD (female)       Past Surgical History:   Procedure Laterality Date    DILATION/CURETTAGE,DIAGNOSTIC      DILATION/CURETTAGE,DIAGNOSTIC      TUBAL LIGATION        Family History   Problem Relation Age of Onset    Hypertension Mother     Diabetes Mother     Heart Disorder Mother     Diabetes Maternal Grandmother       Social History:   Social History     Socioeconomic History    Marital status:    Tobacco Use    Smoking status: Light Smoker    Smokeless tobacco: Never    Tobacco comments:     1-2 daily   Vaping Use    Vaping Use: Never used   Substance and Sexual Activity    Alcohol use: Yes     Comment: ON OCCASIONS    Drug use: Never        Objective:   Physical Exam  Vitals and nursing note reviewed.   Constitutional:       Appearance: Normal appearance.   HENT:      Head: Normocephalic and atraumatic.   Cardiovascular:      Rate and Rhythm: Normal rate and regular rhythm.       Pulses: Normal pulses.      Heart sounds: Normal heart sounds.   Pulmonary:      Effort: Pulmonary effort is normal.      Breath sounds: Normal breath sounds.   Abdominal:      Palpations: Abdomen is soft.   Musculoskeletal:         General: Normal range of motion.      Cervical back: Normal range of motion and neck supple.   Skin:     General: Skin is warm.   Neurological:      Mental Status: She is alert. Mental status is at baseline.   Psychiatric:         Mood and Affect: Mood normal.         Assessment & Plan:   1. Bilateral hand numbness    Possible carpal tunnel I will order EMG study, referral for physiatry, continue with wrist splint,    No orders of the defined types were placed in this encounter.      Meds This Visit:  Requested Prescriptions      No prescriptions requested or ordered in this encounter       Imaging & Referrals:  PHYSIATRY - INTERNAL

## 2024-03-11 ENCOUNTER — HOSPITAL ENCOUNTER (OUTPATIENT)
Age: 44
Discharge: HOME | End: 2024-03-11
Payer: COMMERCIAL

## 2024-03-11 ENCOUNTER — NURSE TRIAGE (OUTPATIENT)
Dept: INTERNAL MEDICINE CLINIC | Facility: CLINIC | Age: 44
End: 2024-03-11

## 2024-03-11 ENCOUNTER — HOSPITAL ENCOUNTER (EMERGENCY)
Facility: HOSPITAL | Age: 44
Discharge: LEFT BEFORE DISCHARGE | End: 2024-03-11
Payer: COMMERCIAL

## 2024-03-11 VITALS
HEIGHT: 67 IN | OXYGEN SATURATION: 98 % | DIASTOLIC BLOOD PRESSURE: 89 MMHG | BODY MASS INDEX: 37.67 KG/M2 | RESPIRATION RATE: 16 BRPM | TEMPERATURE: 98 F | WEIGHT: 240 LBS | HEART RATE: 89 BPM | SYSTOLIC BLOOD PRESSURE: 139 MMHG

## 2024-03-11 VITALS
OXYGEN SATURATION: 100 % | DIASTOLIC BLOOD PRESSURE: 75 MMHG | SYSTOLIC BLOOD PRESSURE: 125 MMHG | HEART RATE: 88 BPM | RESPIRATION RATE: 20 BRPM | TEMPERATURE: 100 F

## 2024-03-11 DIAGNOSIS — M79.651 PAIN IN BOTH THIGHS: Primary | ICD-10-CM

## 2024-03-11 DIAGNOSIS — M79.652 PAIN IN BOTH THIGHS: Primary | ICD-10-CM

## 2024-03-11 PROCEDURE — 99203 OFFICE O/P NEW LOW 30 MIN: CPT | Performed by: NURSE PRACTITIONER

## 2024-03-11 RX ORDER — LIDOCAINE 4 G/G
1 PATCH TOPICAL EVERY 24 HOURS
Qty: 7 PATCH | Refills: 0 | Status: SHIPPED | OUTPATIENT
Start: 2024-03-11 | End: 2024-03-18

## 2024-03-11 RX ORDER — TIZANIDINE 2 MG/1
2 TABLET ORAL EVERY 6 HOURS PRN
Qty: 15 TABLET | Refills: 0 | Status: SHIPPED | OUTPATIENT
Start: 2024-03-11

## 2024-03-11 NOTE — ED INITIAL ASSESSMENT (HPI)
C/o right leg pain radiating to buttock.  Left posterior leg pain also radiating but not as bad as right.  Denies any trauma. Pt works at [employer] in the kitchen.  Pt denies numbness or tingling.

## 2024-03-11 NOTE — ED INITIAL ASSESSMENT (HPI)
Pt c/o low back pain that radiates down bilateral legs. No known injury. Stands for work, lots of bending/picking for job.

## 2024-03-11 NOTE — ED PROVIDER NOTES
Patient Seen in: [facility]      History     Chief Complaint   Patient presents with    Leg or Foot Injury     Stated Complaint: Leg Pain    Subjective:   HPI  Patient is a 43-year-old female who presents the immediate care center with concern for pain to her upper, posterior thighs bilaterally, right greater than left.  This started yesterday.  She denies precipitating trauma.  She is never experienced similar pain before.  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  They dismissed her from work stating she needed to have medical examination.  Patient denies motor or sensory deficits; she denies back pain; denies abdominal pain.          Objective:   Past Medical History:   Diagnosis Date    Esophageal reflux     STD (female)               Past Surgical History:   Procedure Laterality Date    DILATION/CURETTAGE,DIAGNOSTIC      DILATION/CURETTAGE,DIAGNOSTIC      TUBAL LIGATION                  Social History     Socioeconomic History    Marital status:    Tobacco Use    Smoking status: Light Smoker    Smokeless tobacco: Never    Tobacco comments:     1-2 daily   Vaping Use    Vaping Use: Never used   Substance and Sexual Activity    Alcohol use: Yes     Comment: ON OCCASIONS    Drug use: Never              Review of Systems   Constitutional:  Negative for chills and fever.   Respiratory:  Negative for shortness of breath.    Cardiovascular:  Negative for chest pain.   Gastrointestinal:  Negative for abdominal pain.   Musculoskeletal:  Positive for myalgias. Negative for back pain.   Skin:  Negative for rash.   Neurological:  Negative for weakness and numbness.       Positive for stated complaint: Leg Pain  Other systems are as noted in HPI.  Constitutional and vital signs reviewed.      All other systems reviewed and negative except as noted above.    Physical Exam     ED Triage Vitals [03/11/24 1613]   /75   Pulse 88   Resp 20   Temp 99.6 °F (37.6 °C)   Temp  src Oral   SpO2 100 %   O2 Device None (Room air)       Current:/75   Pulse 88   Temp 99.6 °F (37.6 °C) (Oral)   Resp 20   LMP 03/09/2024 (Approximate)   SpO2 100%         Physical Exam  Vitals and nursing note reviewed.   Constitutional:       General: She is not in acute distress.  Pulmonary:      Effort: Pulmonary effort is normal. No respiratory distress.   Abdominal:      Tenderness: There is no right CVA tenderness or left CVA tenderness.   Musculoskeletal:      Cervical back: Normal range of motion and neck supple.      Lumbar back: No tenderness or bony tenderness. Normal range of motion.      Right hip: No deformity or tenderness. Normal range of motion.        Legs:    Skin:     General: Skin is warm and dry.   Neurological:      Mental Status: She is alert and oriented to person, place, and time.   Psychiatric:         Behavior: Behavior normal.               ED Course   Labs Reviewed - No data to display                   MDM      Wells score for DVT = 0    Patient was offered IM Toradol here today as well as a prescription for naproxen.  She states she cannot take any anti-inflammatory medications because since she has had COVID they make her nauseated.    Patient will use over-the-counter acetaminophen and she was given prescriptions for muscle relaxant and Lidoderm patches.  She will follow with her primary care provider next week if symptoms continue.                           Medical Decision Making  Differential diagnoses considered today include, but are not exclusive of: fracture, dislocation, strain, sprain, vascular compromise, DVT, and nerve impingement syndrome.      Problems Addressed:  Pain in both thighs: self-limited or minor problem    Risk  OTC drugs.  Prescription drug management.        Disposition and Plan     Clinical Impression:  1. Pain in both thighs         Disposition:  Discharge  3/11/2024  5:04 pm    Follow-up:  Rosalinda Tucker MD  [address] [place], [place] [address]  [address]  [phone]    Schedule an appointment as soon as possible for a visit in 1 week  As needed          Medications Prescribed:  Current Discharge Medication List        START taking these medications    Details   tiZANidine 2 MG Oral Tab Take 1 tablet (2 mg total) by mouth every 6 (six) hours as needed (muscle cramping).  Qty: 15 tablet, Refills: 0      lidocaine 4 % External Patch Place 1 patch onto the skin daily for 7 days.  Qty: 7 patch, Refills: 0

## 2024-03-11 NOTE — TELEPHONE ENCOUNTER
Action Requested: Summary for Provider     []  Critical Lab, Recommendations Needed  [] Need Additional Advice  [x]   FYI    []   Need Orders  [] Need Medications Sent to Pharmacy  []  Other     SUMMARY: patient has been experiencing 9/10 Bilateral cramping/sore-ache and pain to upper bilateral legs- under buttocks to mid thigh which is constant and present while  walking, sitting, laying down. Per protocol: go to ED now. Patient works at [employer] and will walk over to the ED now.     Reason for call: Leg Pain  Onset: yesterday-sudden when walking up stairs      Bilateral knees swollen- knee pain yesterday not today    Denies numbness, weakness, back pain, calf or feet swelling    Has tried: Epsom salt, massage, heating pad  Massage made it more of a throbbing pain    Has been taking/doing over the counter:  Tylenol with no relief    Can't tolerate ibuprofen    Reason for Disposition   Unable to walk    Protocols used: Leg Pain-A-[place]    Went over home care advice. Call back or ED if new symptoms arise or if s/sx worsen or has questions or concerns. Patient verbalized understanding and agrees with plan.

## 2024-04-19 ENCOUNTER — TELEPHONE (OUTPATIENT)
Facility: CLINIC | Age: 44
End: 2024-04-19

## 2024-04-19 NOTE — TELEPHONE ENCOUNTER
Patient calling to ask if paperwork for Leave of Absence from [facility] was received, stated either faxed or emailed on 04/16/24 or 04/17/24. Stated needed to be returned within 15 business days.

## 2024-04-22 NOTE — TELEPHONE ENCOUNTER
Dr. Tucker,    Pt is requesting intermittent FMLA due to recurrent numbness/burning/pain in fingers. Pt is requesting 1-4 flare ups per month each episode lasting 1 day. Do you support?    Thank you,  Vivian DOMINGUEZ

## 2024-04-22 NOTE — TELEPHONE ENCOUNTER
Type of Leave: Intermittent  Reason for Leave: Numbness and burning in hands/pain in fingers  Start date of leave: 3/6/24 to 9/6/24  How much time needed?: 1-4 days/month lasting 1-24 hrs  Forms Due Date: asap  Was Fee and Turnaround info Given?: yes

## 2024-04-23 ENCOUNTER — TELEPHONE (OUTPATIENT)
Dept: INTERNAL MEDICINE CLINIC | Facility: CLINIC | Age: 44
End: 2024-04-23

## 2024-04-23 NOTE — TELEPHONE ENCOUNTER
Patient is requesting a call back from PCP's nurse regarding the FMLA.   Patient mentions she has already contacted the FMLA Dept.  Please see closed TE of 4/19/24.    Please advise.

## 2024-04-29 ENCOUNTER — OFFICE VISIT (OUTPATIENT)
Dept: PHYSICAL MEDICINE AND REHAB | Facility: CLINIC | Age: 44
End: 2024-04-29
Payer: COMMERCIAL

## 2024-04-29 ENCOUNTER — HOSPITAL ENCOUNTER (OUTPATIENT)
Dept: GENERAL RADIOLOGY | Facility: HOSPITAL | Age: 44
Discharge: HOME | End: 2024-04-29
Attending: PHYSICAL MEDICINE & REHABILITATION
Payer: COMMERCIAL

## 2024-04-29 ENCOUNTER — TELEPHONE (OUTPATIENT)
Dept: PHYSICAL THERAPY | Facility: HOSPITAL | Age: 44
End: 2024-04-29

## 2024-04-29 VITALS — BODY MASS INDEX: 37.67 KG/M2 | WEIGHT: 240 LBS | HEIGHT: 67 IN | OXYGEN SATURATION: 98 % | HEART RATE: 86 BPM

## 2024-04-29 DIAGNOSIS — G56.03 BILATERAL CARPAL TUNNEL SYNDROME: ICD-10-CM

## 2024-04-29 DIAGNOSIS — M47.812 CERVICAL FACET SYNDROME: ICD-10-CM

## 2024-04-29 DIAGNOSIS — R20.0 NUMBNESS IN BOTH HANDS: Primary | ICD-10-CM

## 2024-04-29 DIAGNOSIS — R20.0 NUMBNESS IN BOTH HANDS: ICD-10-CM

## 2024-04-29 DIAGNOSIS — M54.2 TRIGGER POINT OF NECK: ICD-10-CM

## 2024-04-29 DIAGNOSIS — M50.30 DDD (DEGENERATIVE DISC DISEASE), CERVICAL: ICD-10-CM

## 2024-04-29 PROCEDURE — 72050 X-RAY EXAM NECK SPINE 4/5VWS: CPT | Performed by: PHYSICAL MEDICINE & REHABILITATION

## 2024-04-29 NOTE — TELEPHONE ENCOUNTER
Patient called and stated she saw a doctor in physiatry and is on a deadline to get the paperwork faxed in as soon as possible.    Fax   Email - [email or web address] if there are any issues, thanks.

## 2024-04-29 NOTE — PATIENT INSTRUCTIONS
1) My office will call you to schedule the BILATERAL carpal tunnel CSI under ultrasound guidance once the procedure is approved by your insurance carrier.    2) Please begin physical therapy as soon as possible. This will be focused on the neck  3) Purchase resting wrist splints (Carpal tunnel splints) and wear all night while sleeping. These can be purchased on [employer], [employer], [employer], [employer], [employer]  4) Continue plan for EMG of the upper extremities to evaluate for carpal tunnel  5) Please get X-rays of the Cervical spine today on your way out.   6) Follow up with me to review the EMG findings and discuss the next steps.

## 2024-04-29 NOTE — H&P
[facility]  Clinic H&P    Requesting Physician: AMNA BEE MD    Chief Complaint (Reason for Visit):    Chief Complaint   Patient presents with    New Patient     New R hand dominant patient presents for b/l hand pain. Pain has been going on for 8 years. Denies injury. States it feels like a burning pain. Admits numbness of the whole hand, admits she has tingling sometimes when she is working using her hands. Pain 7/10. No pain meds. No recent imaging. Patient states she did PT years ago and felt no relief. Patient also states in 2018 she had an injection in her hands and had a 50% relief for about 6 months. Pain 8/10. No recent imaging.        History of Present Illness:  The patient is a 43 year old right-handed female with no significant past medical history who presents with bilateral hand numbness and tingling primarily affecting digits 1 through 3 which has been ongoing for several years.  She denies any inciting event.  She rates her discomfort a 7 out of 10 and associates this with burning sensation and occasional weakness in her hands to the point that she has difficulty using buttons.  Her symptoms are more so at nighttime but also has difficulty grasping items at work.  She does work as a cook here in the hospital.  She denies any previous EMGs.  She has had injections into the carpal tunnel several years ago which gave her about 50% relief for 6 months.  She denies any surgery.  She tried physical therapy several years ago without improvement.  She is also having some neck stiffness and will occasionally have radicular symptoms into her arms.    PAST MEDICAL HISTORY:   Past Medical History:    Esophageal reflux    STD (female)       PAST SURGICAL HISTORY:   Past Surgical History:   Procedure Laterality Date    Dilation/curettage,diagnostic      Dilation/curettage,diagnostic      Tubal ligation          FAMILY HISTORY:   Family History   Problem  Relation Age of Onset    Hypertension Mother     Diabetes Mother     Heart Disorder Mother     Diabetes Maternal Grandmother        SOCIAL HISTORY:   Social History     Occupational History    Not on file   Tobacco Use    Smoking status: Light Smoker    Smokeless tobacco: Never    Tobacco comments:     1-2 daily   Vaping Use    Vaping status: Never Used   Substance and Sexual Activity    Alcohol use: Yes     Comment: ON OCCASIONS    Drug use: Never    Sexual activity: Not on file       CURRENT MEDICATIONS:   Current Outpatient Medications   Medication Sig Dispense Refill    tiZANidine 2 MG Oral Tab Take 1 tablet (2 mg total) by mouth every 6 (six) hours as needed (muscle cramping). (Patient not taking: Reported on 4/29/2024) 15 tablet 0    acetaminophen 500 MG Oral Tab acetaminophen 500 MG Oral Tab, [RxNorm: 741466] (Patient not taking: Reported on 3/11/2024)          ALLERGIES:   Allergies   Allergen Reactions    Shrimp          REVIEW OF SYSTEMS:   Review of Systems   Constitutional: Negative.    HENT: Negative.    Eyes: Negative.    Respiratory: Negative.    Cardiovascular: Negative.    Gastrointestinal: Negative.    Genitourinary: Negative.    Musculoskeletal: As per HPI   Skin: Negative.    Neurological: As per HPI  Endo/Heme/Allergies: Negative.    Psychiatric/Behavioral: Negative.      All other systems reviewed and are negative. Pertinent positives and negatives noted in the HPI.        PHYSICAL EXAM:   Pulse 86   Ht 67\"   Wt 240 lb (108.9 kg)   LMP 03/09/2024 (Approximate)   SpO2 98%   BMI 37.59 kg/m²     Body mass index is 37.59 kg/m².      General: No immediate distress  Head: Normocephalic/ Atraumatic  Eyes: Extra-occular movements intact.   Ears: No auricular hematoma or deformities  Mouth: No lesions or ulcerations  Heart: peripheral pulses intact. Normal capillary refill.   Lungs: Non-labored respirations  Abdomen: No abdominal guarding  Extremities: No lower extremity edema bilaterally   Skin:  No lesions noted.   Cognition: alert & oriented x 3, attentive, able to follow 2 step commands, comprehention intact, spontaneous speech intact  Motor:    Musculoskeletal:    WRIST/HAND:  Inspection: no erythema, swelling, or obvious deformity  Palpation: no ttp in carpal bones or areas of pain  ROM: intact to all planes of motion  Strength: 5 out of 5 in all myotomes of the upper extremities  Sensation: Intact to light touch in all dermatomes of the upper extremities  Reflexes: 2/4 at C5, C6, C7  Phalens Test: Positive bilaterally  Tinel's test: Positive at the wrist bilaterally    CERVICAL SPINE:  Inspection: no erythema, swelling, or obvious deformity  Palpation: Tender to palpation over the bilateral cervical facets and paraspinals  ROM: intact to all planes of motion of cervical spine including side-bend bilaterally, rotation bilaterally, flexion, and extension with pain during extension and rotation bilaterally  Strength: 5/5 in all myotomes of the BILATERAL upper extremities   Sensation: Intact to light touch in all dermatomes of the BILATERAL upper extremities   Reflexes: 2/4 at C5, C6, C7 with a negative Moss's sign  Spurling Test: negative for radicular symptoms down either extremity bilaterally          Gait:  Normal    Data  Admission on 01/17/2024, Discharged on 01/17/2024   Component Date Value Ref Range Status    Ventricular rate 01/17/2024 86  BPM Final    Atrial rate 01/17/2024 86  BPM Final    P-R Interval 01/17/2024 156  ms Final    QRS Duration 01/17/2024 78  ms Final    Q-T Interval 01/17/2024 362  ms Final    QTC Calculation (Bezet) 01/17/2024 433  ms Final    P Axis 01/17/2024 28  degrees Final    R Axis 01/17/2024 19  degrees Final    T Axis 01/17/2024 50  degrees Final    Glucose 01/17/2024 97  70 - 99 mg/dL Final    Sodium 01/17/2024 140  136 - 145 mmol/L Final    Potassium 01/17/2024 3.6  3.5 - 5.1 mmol/L Final    Chloride 01/17/2024 106  98 - 112 mmol/L Final    CO2 01/17/2024 27.0   21.0 - 32.0 mmol/L Final    Anion Gap 01/17/2024 7  0 - 18 mmol/L Final    BUN 01/17/2024 15  9 - 23 mg/dL Final    Creatinine 01/17/2024 0.72  0.55 - 1.02 mg/dL Final    BUN/CREA Ratio 01/17/2024 20.8 (H)  10.0 - 20.0 Final    Calcium, Total 01/17/2024 9.3  8.7 - 10.4 mg/dL Final    Calculated Osmolality 01/17/2024 291  275 - 295 mOsm/kg Final    eGFR-Cr 01/17/2024 106  >=60 mL/min/1.73m2 Final    ALT 01/17/2024 14  10 - 49 U/L Final    AST 01/17/2024 17  <=34 U/L Final    Alkaline Phosphatase 01/17/2024 62  37 - 98 U/L Final    Bilirubin, Total 01/17/2024 0.3  0.3 - 1.2 mg/dL Final    Total Protein 01/17/2024 7.7  5.7 - 8.2 g/dL Final    Albumin 01/17/2024 4.2  3.2 - 4.8 g/dL Final    Globulin  01/17/2024 3.5  2.8 - 4.4 g/dL Final    A/G Ratio 01/17/2024 1.2  1.0 - 2.0 Final    Hold Lavender 01/17/2024 Auto Resulted   Final    Hold Lt Green 01/17/2024 Auto Resulted   Final    Hold Blue 01/17/2024 Auto Resulted   Final    WBC 01/17/2024 7.5  4.0 - 11.0 x10(3) uL Final    RBC 01/17/2024 3.99  3.80 - 5.30 x10(6)uL Final    HGB 01/17/2024 11.6 (L)  12.0 - 16.0 g/dL Final    HCT 01/17/2024 36.3  35.0 - 48.0 % Final    MCV 01/17/2024 91.0  80.0 - 100.0 fL Final    MCH 01/17/2024 29.1  26.0 - 34.0 pg Final    MCHC 01/17/2024 32.0  31.0 - 37.0 g/dL Final    RDW-SD 01/17/2024 46.5 (H)  35.1 - 46.3 fL Final    RDW 01/17/2024 13.8  11.0 - 15.0 % Final    PLT 01/17/2024 342.0  150.0 - 450.0 10(3)uL Final    Neutrophil Absolute Prelim 01/17/2024 3.65  1.50 - 7.70 x10 (3) uL Final    Neutrophil Absolute 01/17/2024 3.65  1.50 - 7.70 x10(3) uL Final    Lymphocyte Absolute 01/17/2024 2.76  1.00 - 4.00 x10(3) uL Final    Monocyte Absolute 01/17/2024 0.56  0.10 - 1.00 x10(3) uL Final    Eosinophil Absolute 01/17/2024 0.45  0.00 - 0.70 x10(3) uL Final    Basophil Absolute 01/17/2024 0.04  0.00 - 0.20 x10(3) uL Final    Immature Granulocyte Absolute 01/17/2024 0.02  0.00 - 1.00 x10(3) uL Final    Neutrophil % 01/17/2024 48.8  % Final     Lymphocyte % 01/17/2024 36.9  % Final    Monocyte % 01/17/2024 7.5  % Final    Eosinophil % 01/17/2024 6.0  % Final    Basophil % 01/17/2024 0.5  % Final    Immature Granulocyte % 01/17/2024 0.3  % Final    Troponin I (High Sensitivity) 01/17/2024 92 (HH)  <=34 ng/L Final    TSH 01/17/2024 0.614  0.550 - 4.780 mIU/mL Final    D-Dimer 01/17/2024 0.59 (H)  <0.50 ug/mL FEU Final    Cholesterol, Total 01/17/2024 143  <200 mg/dL Final    HDL Cholesterol 01/17/2024 40  40 - 59 mg/dL Final    Triglycerides 01/17/2024 154 (H)  30 - 149 mg/dL Final    LDL Cholesterol 01/17/2024 76  <100 mg/dL Final    VLDL 01/17/2024 24  0 - 30 mg/dL Final    Non HDL Chol 01/17/2024 103  <130 mg/dL Final    Troponin I (High Sensitivity) 01/17/2024 87 (HH)  <=34 ng/L Final    Ventricular rate 01/17/2024 69  BPM Final    Atrial rate 01/17/2024 69  BPM Final    P-R Interval 01/17/2024 164  ms Final    QRS Duration 01/17/2024 74  ms Final    Q-T Interval 01/17/2024 398  ms Final    QTC Calculation (Bezet) 01/17/2024 426  ms Final    P Axis 01/17/2024 33  degrees Final    R Axis 01/17/2024 21  degrees Final    T Axis 01/17/2024 33  degrees Final   [facility] Lab Encounter on 11/10/2023   Component Date Value Ref Range Status    HgbA1C 11/10/2023 6.0 (H)  <5.7 % Final    Estimated Average Glucose 11/10/2023 126  68 - 126 mg/dL Final    Vitamin B12 11/10/2023 540  211 - 911 pg/mL Final    TSH 11/10/2023 0.864  0.550 - 4.780 mIU/mL Final    Vitamin D, 25OH, Total 11/10/2023 10.8 (L)  30.0 - 100.0 ng/mL Final   ]      Radiology Imaging:  NA    ASSESSMENT AND PLAN:  The patient is a pleasant 43-year-old female presents with bilateral hand numbness and tingling as well as bilateral neck stiffness.  I believe her hand numbness and tingling is due to carpal tunnel syndrome.  She does have mild weakness during left thumb abduction.  She also has a positive Etelvina sign and Phalen's test.  As a pertains to her neck pain, I believe is due to cervical  spondylosis with facet arthropathy.  Her manual muscle testing of the upper extremities was normal.  I have recommended she follow through with the EMG that was previously ordered of the upper extremities.  I have also recommended bilateral carpal tunnel corticosteroid injections under ultrasound guidance.  She should begin physical therapy soon as possible for the cervical spine and continue to use wrist splints at nighttime.  I have ordered x-rays of the cervical spine and she will follow-up with me to review the EMG findings as well as for the bilateral carpal tunnel injections.       RTC in 4 weeks for bilateral carpal tunnel injections    Discharge Instructions were provided as documented in AVS summary.  The patient was in agreement with the assessment and plan.  All questions were answered.  There were no barriers to learning.         1. Numbness in both hands    2. Cervical facet syndrome    3. Trigger point of neck    4. DDD (degenerative disc disease), cervical    5. Bilateral carpal tunnel syndrome        Alex B. Behar MD, [facility]  Physical Medicine and Rehabilitation/Sports Medicine  [facility]

## 2024-04-30 ENCOUNTER — OFFICE VISIT (OUTPATIENT)
Dept: PHYSICAL THERAPY | Facility: HOSPITAL | Age: 44
End: 2024-04-30
Attending: PHYSICAL MEDICINE & REHABILITATION
Payer: COMMERCIAL

## 2024-04-30 ENCOUNTER — TELEPHONE (OUTPATIENT)
Dept: PHYSICAL MEDICINE AND REHAB | Facility: CLINIC | Age: 44
End: 2024-04-30

## 2024-04-30 ENCOUNTER — TELEPHONE (OUTPATIENT)
Dept: INTERNAL MEDICINE CLINIC | Facility: CLINIC | Age: 44
End: 2024-04-30

## 2024-04-30 DIAGNOSIS — M54.2 TRIGGER POINT OF NECK: ICD-10-CM

## 2024-04-30 DIAGNOSIS — G56.03 BILATERAL CARPAL TUNNEL SYNDROME: ICD-10-CM

## 2024-04-30 DIAGNOSIS — R20.0 NUMBNESS IN BOTH HANDS: Primary | ICD-10-CM

## 2024-04-30 DIAGNOSIS — M47.812 CERVICAL FACET SYNDROME: ICD-10-CM

## 2024-04-30 DIAGNOSIS — M50.30 DDD (DEGENERATIVE DISC DISEASE), CERVICAL: ICD-10-CM

## 2024-04-30 PROCEDURE — 97110 THERAPEUTIC EXERCISES: CPT

## 2024-04-30 PROCEDURE — 97161 PT EVAL LOW COMPLEX 20 MIN: CPT

## 2024-04-30 NOTE — TELEPHONE ENCOUNTER
Initiated authorization for Bilateral carpal tunnel CSI under ultrasound guidance CPT/HCPCS 73506-74, , [id number] with [employer]  Case #[id number]  Status: Approved-authorization is not required per health plan based on medical necessity however is not a guarantee of payment and may be subject to review once claim is submitted

## 2024-04-30 NOTE — TELEPHONE ENCOUNTER
Spoke with pt,  verified, pt asked why Dr. Rosalinda Tucker  only approved a month of her FMLA.  Pt c/o bilateral hand pain and numbness, pain radiated to her neck.   Pt was already seen by  Dr Rosalinda Tucker and  Dr Alex Behar ( physiatry)  Pt currently working in the kitchen at [employer], sometimes she can't work due to hands pain and numbness, pt sx been going since 2016.  F/u appt made with Dr Rosalinda Tucker as requested.       FYI            Future Appointments   Date Time Provider Department Center   2024  1:15 PM Shante German, PT [facility] NEURO PT EM [facility]   5/10/2024  9:15 AM Rosalinda Tucker MD [facility] [place]

## 2024-05-06 ENCOUNTER — OFFICE VISIT (OUTPATIENT)
Dept: PHYSICAL THERAPY | Facility: HOSPITAL | Age: 44
End: 2024-05-06
Attending: PHYSICAL MEDICINE & REHABILITATION
Payer: COMMERCIAL

## 2024-05-06 PROCEDURE — 97110 THERAPEUTIC EXERCISES: CPT

## 2024-05-06 PROCEDURE — 97112 NEUROMUSCULAR REEDUCATION: CPT

## 2024-05-06 PROCEDURE — 97530 THERAPEUTIC ACTIVITIES: CPT

## 2024-05-06 NOTE — PROGRESS NOTES
Manju BURT Leggin    12/10/1980  Diagnosis: Numbness in both hands (R20.0)  Cervical facet syndrome (M47.812)  Trigger point of neck (M54.2)  DDD (degenerative disc disease), cervical (M50.30)  Bilateral carpal tunnel syndrome (G56.03)    Referring Physician:  Alex Behar Next MD visit: 5/20/2024  Initial Evaluation Date: 4/30/2024  Date of Surgery: None for this diagnosis  Insurance: [employer]  Authorized # of visits:  NA by 4/29/2025  Plan of care to: 7/29/2024    Precautions/Hx: GERD, tubal ligation    SUBJECTIVE:     Pt reports that she was not too sore after the last visit and has had decreased max and ave pain levels    Visit count  1/8 2/8    Date 4/30/2024 5/6/2024     Cervical upper T      Pain Range 4-5 to 8/10 4-5 to 6/10    Pain Ave 7/10 5/10    Pain Current 6-7/10 5/10       OBJECTIVE:     Treatment performed this date:    Therapeutic Exercise:  Visit #   1/8 2/8   Position Exercise HEP 4/30/2024 5/6/2024    Supine Median nerve glide H X     Ulnar nerve glide H X      Radial nerve glide H X    SItting Median nerve glide H X  X     Ulnar nerve glide H X  X     Radial nerve glide H X X    Standing Wall median nerve glide H  X 10x    Corner stretch H  X 3x    Doorway stretch H  X 3x   1/2 Foam roll Median nerve glides B    X 10x    B shldr abd   X 10x    B shldr flex   X 10x   Foam roll Median nerve glide B   X 10x    B shldr abd   X 6x    B shldr flex   X 10x   Neuro Re-ed   X see assessment    Ther Act Function   X progression for reaching above shldr level   H = HEP. Pt given copies of this exercise for home program.  X = Exercises done this date - pt verbalized understanding and demonstrated competence. All exercises done B unless otherwise indicated.  Pt advised to discontinue exercises that increase pain and to call or return to therapist to discuss. Each intervention above is specifically prescribed to address the patients identified impairments, activity limitations, and participation  restrictions.    ASSESSMENT     Pt educated on the importance of full UE and upper quadrant flexibility for good neural mobility.  Pt able to progress to median glide on the wall.  Pt well challenged w 1/2 and full foam roll exercises demonstrates restrictions in R>L shoulders.  Pt added well to her HEP this date.  Pt also educated on importance of consistent gentle and progressive mobility.  Beginning level discussion of therapeutic neuroscience concepts.  Reviewed use of deep breathing w long exhale, meditation and self calming opposite side tapping.      Physical Therapy Goals:  From 4/30/2024 to 7/29/2024  - Created w patient input during initial assessment  1. Pt will be independent in beginning level of HEP for stretching, posture and strengthening.   2. Pt will be able to button shirt without increased symptoms.  3. Pt will be able to lift pots of water for cooking at work without increased symptoms.  4. Pt will be able to fix hair without increased symptoms.  5. Pt will be able to reach into upper cabinet without increased symptoms.     PLAN OF CARE:      Continue PT per original plan for therapeutic exercises, posture retraining, therapeutic activities, manual treatment, neuromuscular reeducation, therapeutic pain neuroscience education, patient education, self care home management, home exercise program, and modalities as needed.    Charged Units Units Minutes   Ther Ex 2 24   Neuro 1 13   Ther Activity 1 8   Gait     Man Tx          Total Timed  45   Total Tx Time  45         _____________________________________________________________________________________________    21st Century Cures Act Notice to Patient: Medical documents like this are made available to patients in the interest of transparency. However, be advised this is a medical document and it is intended as [id number] communication between your medical providers. This medical document may contain abbreviations, assessments, medical data,  and results or other terms that are unfamiliar. Medical documents are intended to carry relevant information, facts as evident, and the clinical opinion of the practitioner. As such, this medical document may be written in language that appears blunt or direct. You are encouraged to contact your medical provider and/or [facility] Patient Experience if you have any questions about this medical document.    Objective Initial Evaluation Data 4/30/2024:    Neck Disability Index Score  Score: 42 % (4/29/2024  5:28 PM)      Posture:  increased thoracic kyphosis  Palpation: tight B UT    Dermatomes 4/30/2024       Lat neck (C4) R wnl   Lat neck (C4) L wnl   Ant deltoid (C5) R wnl   Ant deltoid (C5) L wnl   Dorsal prox thumb (C6) R wnl   Dorsal prox thumb (C6) L wnl   Dorsal prox 3 digit (C7) R wnl   Dorsal prox 3 digit (C7) L DULL   Dorsal 4-5 digits (C8) R wnl   Dorsal 4-5 digits (C8) L wnl   Med prox elbow (T1) R wnl   Med prox elbow (T1) L wnl       UE Neural Glides 4/30/2024   ULTT 1 R min   ULTT 1 L mod   ULTT 2 R Min-mod   ULTT 2 L  min   ULTT 3 R wnl   ULTT 3 L mod       Cervical ROM 4/30/2024   Fwd head 27   Flexion 60 nl 50   Extension 70 nl 48   R SB 45 nl 28*   L SB 45 nl 25*   R Rotation 80 nl 59*   L Rotation 80 nl 58*   *pain limiting     Shoulder ROM 4/30/2024   Flex R  180 nl 151   Flex L  180 nl 148*   Extn R  50 nl 48   Extn L  50 nl 48   Abd R  180 nl 169*   Abd L  180 nl 161*   ER R  90 nl 95   ER L  90 nl 85   IR R  80 nl 72   IR L  80 nl 70   *pain limiting    MMT 5/5 4/30/2024   C5 shldr abd R 4*   Shldr abd L 5   C6 biceps R 5   Biceps L 5   C7 Triceps R 5   Triceps L 5   C8 EPL R 5-   EPL L 4   T1 Interossei R 5   Interossei L 4+   ER R 5   ER L 4+   IR R 5   IR L 4+   *pain limiting      UE flexibility 4/30/2024   UT R Min-mod   UT L Min-mod   Scalenes R mod   Scalenes L mod   Lats R min   Lats L min       Imaging:   PROCEDURE: XR CERVICAL SPINE AP LAT FLEX EXT EM (CPT=94940)      COMPARISON: None.     INDICATIONS: Chronic neck pain with bilateral arm tingling and numbness.     TECHNIQUE: Cervical spine radiographs with flexion and extension views. (5 views)     FINDINGS:       ALIGNMENT: Normal alignment.    ATLANTOAXIAL: Normal odontoid and atlantoaxial joints.    VERTEBRAL BODIES:   Multilevel cervical spondylosis with disc space narrowing endplate marginal osteophyte formation most pronounced at C4-5 C5-6 C6-7.  Posterior endplate osteophyte encroachment C6-7.  Minimal degeneration at C2-3 and C3-4.    PREVERTEBRAL SOFT TISSUES: Negative.    FLEXION/EXTENSION: Normal range of motion.  No subluxation during flexion and extension.    OTHER: Negative.                 Impression   CONCLUSION:  1. Multilevel cervical spondylosis most pronounced at C6-7.    2. Normal range of motion with flexion extension  3. No acute fracture or acute malalignment.           Dictated by (CST): Te Prater MD on 4/29/2024 at 5:10 PM      Finalized by (CST): Te Prater MD on 4/29/2024 at 5:12 PM

## 2024-05-13 ENCOUNTER — APPOINTMENT (OUTPATIENT)
Dept: PHYSICAL THERAPY | Facility: HOSPITAL | Age: 44
End: 2024-05-13
Attending: PHYSICAL MEDICINE & REHABILITATION
Payer: COMMERCIAL

## 2024-05-20 ENCOUNTER — OFFICE VISIT (OUTPATIENT)
Dept: PHYSICAL MEDICINE AND REHAB | Facility: CLINIC | Age: 44
End: 2024-05-20

## 2024-05-20 DIAGNOSIS — R20.0 NUMBNESS IN BOTH HANDS: Primary | ICD-10-CM

## 2024-05-20 DIAGNOSIS — G56.03 BILATERAL CARPAL TUNNEL SYNDROME: ICD-10-CM

## 2024-05-20 NOTE — PROCEDURES
[facility]  Carpal Tunnel Injection Procedure Note    CHIEF COMPLAINT:  No chief complaint on file.      PROCEDURE PERFORMED: Bilateral carpal tunnel corticosteroid injection under ultrasound guidance    INDICATIONS:  Bilateral carpal tunnel syndrome     PRIMARY PROCEDURALIST:  Alex Behar, MD    INFORMED CONSENT & TIME OUT:   As documented in the Time Out and Pre-Procedure Check Lists.  Verbal consent was obtained    Vitals: [unfilled]  Labs (document last wbc, plts, hgb, and PT/INR):    Admission on 01/17/2024, Discharged on 01/17/2024   Component Date Value Ref Range Status    Ventricular rate 01/17/2024 86  BPM Final    Atrial rate 01/17/2024 86  BPM Final    P-R Interval 01/17/2024 156  ms Final    QRS Duration 01/17/2024 78  ms Final    Q-T Interval 01/17/2024 362  ms Final    QTC Calculation (Bezet) 01/17/2024 433  ms Final    P Axis 01/17/2024 28  degrees Final    R Axis 01/17/2024 19  degrees Final    T Axis 01/17/2024 50  degrees Final    Glucose 01/17/2024 97  70 - 99 mg/dL Final    Sodium 01/17/2024 140  136 - 145 mmol/L Final    Potassium 01/17/2024 3.6  3.5 - 5.1 mmol/L Final    Chloride 01/17/2024 106  98 - 112 mmol/L Final    CO2 01/17/2024 27.0  21.0 - 32.0 mmol/L Final    Anion Gap 01/17/2024 7  0 - 18 mmol/L Final    BUN 01/17/2024 15  9 - 23 mg/dL Final    Creatinine 01/17/2024 0.72  0.55 - 1.02 mg/dL Final    BUN/CREA Ratio 01/17/2024 20.8 (H)  10.0 - 20.0 Final    Calcium, Total 01/17/2024 9.3  8.7 - 10.4 mg/dL Final    Calculated Osmolality 01/17/2024 291  275 - 295 mOsm/kg Final    eGFR-Cr 01/17/2024 106  >=60 mL/min/1.73m2 Final    ALT 01/17/2024 14  10 - 49 U/L Final    AST 01/17/2024 17  <=34 U/L Final    Alkaline Phosphatase 01/17/2024 62  37 - 98 U/L Final    Bilirubin, Total 01/17/2024 0.3  0.3 - 1.2 mg/dL Final    Total Protein 01/17/2024 7.7  5.7 - 8.2 g/dL Final    Albumin 01/17/2024 4.2  3.2 - 4.8 g/dL Final    Globulin  01/17/2024 3.5   2.8 - 4.4 g/dL Final    A/G Ratio 01/17/2024 1.2  1.0 - 2.0 Final    Hold Lavender 01/17/2024 Auto Resulted   Final    Hold Lt Green 01/17/2024 Auto Resulted   Final    Hold Blue 01/17/2024 Auto Resulted   Final    WBC 01/17/2024 7.5  4.0 - 11.0 x10(3) uL Final    RBC 01/17/2024 3.99  3.80 - 5.30 x10(6)uL Final    HGB 01/17/2024 11.6 (L)  12.0 - 16.0 g/dL Final    HCT 01/17/2024 36.3  35.0 - 48.0 % Final    MCV 01/17/2024 91.0  80.0 - 100.0 fL Final    MCH 01/17/2024 29.1  26.0 - 34.0 pg Final    MCHC 01/17/2024 32.0  31.0 - 37.0 g/dL Final    RDW-SD 01/17/2024 46.5 (H)  35.1 - 46.3 fL Final    RDW 01/17/2024 13.8  11.0 - 15.0 % Final    PLT 01/17/2024 342.0  150.0 - 450.0 10(3)uL Final    Neutrophil Absolute Prelim 01/17/2024 3.65  1.50 - 7.70 x10 (3) uL Final    Neutrophil Absolute 01/17/2024 3.65  1.50 - 7.70 x10(3) uL Final    Lymphocyte Absolute 01/17/2024 2.76  1.00 - 4.00 x10(3) uL Final    Monocyte Absolute 01/17/2024 0.56  0.10 - 1.00 x10(3) uL Final    Eosinophil Absolute 01/17/2024 0.45  0.00 - 0.70 x10(3) uL Final    Basophil Absolute 01/17/2024 0.04  0.00 - 0.20 x10(3) uL Final    Immature Granulocyte Absolute 01/17/2024 0.02  0.00 - 1.00 x10(3) uL Final    Neutrophil % 01/17/2024 48.8  % Final    Lymphocyte % 01/17/2024 36.9  % Final    Monocyte % 01/17/2024 7.5  % Final    Eosinophil % 01/17/2024 6.0  % Final    Basophil % 01/17/2024 0.5  % Final    Immature Granulocyte % 01/17/2024 0.3  % Final    Troponin I (High Sensitivity) 01/17/2024 92 (HH)  <=34 ng/L Final    TSH 01/17/2024 0.614  0.550 - 4.780 mIU/mL Final    D-Dimer 01/17/2024 0.59 (H)  <0.50 ug/mL FEU Final    Cholesterol, Total 01/17/2024 143  <200 mg/dL Final    HDL Cholesterol 01/17/2024 40  40 - 59 mg/dL Final    Triglycerides 01/17/2024 154 (H)  30 - 149 mg/dL Final    LDL Cholesterol 01/17/2024 76  <100 mg/dL Final    VLDL 01/17/2024 24  0 - 30 mg/dL Final    Non HDL Chol 01/17/2024 103  <130 mg/dL Final    Troponin I (High  Sensitivity) 01/17/2024 87 (HH)  <=34 ng/L Final    Ventricular rate 01/17/2024 69  BPM Final    Atrial rate 01/17/2024 69  BPM Final    P-R Interval 01/17/2024 164  ms Final    QRS Duration 01/17/2024 74  ms Final    Q-T Interval 01/17/2024 398  ms Final    QTC Calculation (Bezet) 01/17/2024 426  ms Final    P Axis 01/17/2024 33  degrees Final    R Axis 01/17/2024 21  degrees Final    T Axis 01/17/2024 33  degrees Final   ]    PROCEDURE:  The Bilateral wrist was prepped and draped in the standard sterile fashion using 3 sticks of Betadine. Using a linear high frequency probe, the Bilateral median nerve was identified in the carpal tunnel in the transverse plane.  The ulnar artery and veins were visualized. A 25-gauge 1.5 inch needle was inserted in the plane technique.  2 cc of 1% lidocaine was used to anesthetize the skin and soft tissue.  Once the needle was seen abutting the Bilateral median nerve, a mixture of 1cc 1% lidocaine and 1 cc of kenalog containing 40 mg of corticosteroid was visualized being injected around the median nerve.  The needle was then removed, hemostasis was obtained, Band-Aid was applied.  Patient tolerated the procedure well.  The patient was able to get up and ambulate.  The patient had full feeling in the Bilateral hand.  Permanent pictures were obtained    INSTRUCTIONS GIVEN TO PATIENT:    \"You will see an effect in the next 2-3 days.  Please contact me if you have fevers, worsening swelling, worsening pain, decreased range of motion, increased redness, chills, or anything that makes you concerned about how the joint we injected feels/looks.  If you do not reach me in a reasonable time, please report directly to the emergency room for further evaluation\"        Alex B. Behar MD, [facility]  Physical Medicine and Rehabilitation/Sports Medicine  [facility]

## 2024-05-20 NOTE — PATIENT INSTRUCTIONS
Post Injection Instructions     Please do not do anything strenuous over the next two days (if you had a knee injection do not walk more than 2 city blocks, do not attend any aerobic classes, do not run, no heavy lifting, no prolong standing).  You may resume your day to day activities after your injection.  You may experience some mild amount of swelling after the procedure.  Please ice your joint that was injected at least 5-6 times a day (15 minutes) for two days after (this will help prevent worsening pain that sometimes occurs after an injection).  Only take tylenol if needed for pain for the first few days.  Watch for signs of infection which include redness, warmth, worsening pain, fevers or chills.  If you develop any of these signs call the office immediately at [phone]    Everyone responds differently to injections, but you can expect your peak effects a few weeks after your last injection.  Alex B. Behar MD  Physical Medicine and Rehabilitation/Sports Medicine  [facility]

## 2024-05-21 ENCOUNTER — APPOINTMENT (OUTPATIENT)
Dept: PHYSICAL THERAPY | Facility: HOSPITAL | Age: 44
End: 2024-05-21
Attending: PHYSICAL MEDICINE & REHABILITATION
Payer: COMMERCIAL

## 2024-06-11 ENCOUNTER — APPOINTMENT (OUTPATIENT)
Dept: PHYSICAL THERAPY | Facility: HOSPITAL | Age: 44
End: 2024-06-11
Attending: PHYSICAL MEDICINE & REHABILITATION
Payer: COMMERCIAL

## 2024-06-11 ENCOUNTER — TELEPHONE (OUTPATIENT)
Dept: PHYSICAL THERAPY | Facility: HOSPITAL | Age: 44
End: 2024-06-11

## 2024-06-13 ENCOUNTER — TELEPHONE (OUTPATIENT)
Dept: INTERNAL MEDICINE CLINIC | Facility: CLINIC | Age: 44
End: 2024-06-13

## 2024-06-13 ENCOUNTER — PROCEDURE VISIT (OUTPATIENT)
Dept: PHYSICAL MEDICINE AND REHAB | Facility: CLINIC | Age: 44
End: 2024-06-13
Payer: COMMERCIAL

## 2024-06-13 DIAGNOSIS — G56.03 BILATERAL CARPAL TUNNEL SYNDROME: Primary | ICD-10-CM

## 2024-06-13 NOTE — PROGRESS NOTES
[facility]  Electromyography Consultation      History of Present Illness:    Dear Dr. Tucker  Thank you for the opportunity to see Manju Payne for electrodiagnostic consultation today. As you know the patient is a 43 year old female with a chief complaint of a long history of carpal tunnel syndrome.  She has had carpal tunnel injections most recently on May 20, 2024 by Dr. Behar, unfortunately these were not helpful.  All fingers but the small finger hand numbness and tingling which sometimes wakes her up at night.      PAST MEDICAL HISTORY:  Past Medical History:    Esophageal reflux    STD (female)       SURGICAL HISTORY:  Past Surgical History:   Procedure Laterality Date    Dilation/curettage,diagnostic      Dilation/curettage,diagnostic      Tubal ligation         SOCIAL HISTORY:   Social History     Occupational History    Not on file   Tobacco Use    Smoking status: Light Smoker    Smokeless tobacco: Never    Tobacco comments:     1-2 daily   Vaping Use    Vaping status: Never Used   Substance and Sexual Activity    Alcohol use: Yes     Comment: ON OCCASIONS    Drug use: Never    Sexual activity: Not on file       FAMILY HISTORY:   Family History   Problem Relation Age of Onset    Hypertension Mother     Diabetes Mother     Heart Disorder Mother     Diabetes Maternal Grandmother        CURRENT MEDICATIONS:   Current Outpatient Medications   Medication Sig Dispense Refill    tiZANidine 2 MG Oral Tab Take 1 tablet (2 mg total) by mouth every 6 (six) hours as needed (muscle cramping). (Patient not taking: Reported on 4/29/2024) 15 tablet 0    acetaminophen 500 MG Oral Tab acetaminophen 500 MG Oral Tab, [RxNorm: 044206] (Patient not taking: Reported on 3/11/2024)         PHYSICAL EXAM:   LMP 03/09/2024 (Approximate)     There is no height or weight on file to calculate BMI.      General: No immediate distress   Extremities: peripheral pulses intact, no lower  extremity edema bilaterally   Skin: No lesions noted.   Neuro:   Strength: Upper extremities have 5/5 strength  Muscle bulk: APB atrophy on the left  Sensation: Decreased in the median distribution bilaterally  Reflexes: Normal upper extremities  Tinel's sign: Positive over the left wrist, equivocal on the right    EMG/NCV  Sensory NCS      Nerve / Sites Distance Segments Peak Lat NP Amp    cm  ms µV   R MEDIAN - Dig III Antidr      Wrist 14 Wrist - Dig III NR NR      Ref.  Ref. 3.80 20.0      Palm 7 Palm - Dig III NR NR   L MEDIAN - Dig III Antidr      Wrist 14 Wrist - Dig III NR NR      Ref.  Ref. 3.80 20.0      Palm 7 Palm - Dig III NR NR   R ULNAR - Dig V Antidr      Wrist 14 Wrist - Dig V 2.70 17.5      Ref.  Ref. 3.80 10.0   L ULNAR - Dig V Antidr      Wrist 14 Wrist - Dig V 2.75 13.3      Ref.  Ref. 3.80 10.0       Motor NCS      Nerve / Sites Distance Segments Latency Amplitude Velocity Amp.1-2 Peak Dur. Area    cm  ms mV m/s % ms mVms   R MEDIAN - APB      Wrist 8 Wrist - APB 9.70 6.0  100 5.00 17.2      Ref.  Ref. 4.40 4.0          Elbow 23 Elbow - Wrist 14.70 5.2 46.0 86.2 4.55 13.4      Ref.  Ref.   49.0      L MEDIAN - APB      Wrist 8 Wrist - APB NR NR  NR NR NR      Ref.  Ref. 4.40 4.0          Elbow  Elbow - Wrist NR NR  NR NR NR      Ref.  Ref.   49.0      R ULNAR - ADM      Wrist 8 Wrist - ADM 2.40 9.9  100 5.45 28.6      Ref.  Ref. 4.20 5.0          B.Elbow 23.5 B.Elbow - Wrist 6.15 7.9 62.7 79.6 5.60 25.9      Ref.  Ref.   50.0      L ULNAR - ADM      Wrist 8 Wrist - ADM 2.25 11.5  100 5.35 37.0      Ref.  Ref. 4.20 5.0          B.Elbow 23.5 B.Elbow - Wrist 6.50 11.1 55.3 96.4 5.80 33.8      Ref.  Ref.   50.0          EMG Summary Table     Spontaneous MUAP Recruitment    IA Fib PSW Fasc H.F. Amp Dur. PPP Pattern   R. TRICEPS N None None None None N N N N   R. BICEPS N None None None None N N N N   R. FLEX CARPI RAD N None None None None N N N N   R. FIRST D INTEROSS N None None None None N N N N    R. ABD POLL BREVIS N None 1+ None None N N N N   L. TRICEPS N None None None None N N N N   L. BICEPS N None None None None N N N N   L. FLEX CARPI RAD N None None None None N N N N   L. FIRST D INTEROSS N None None None None N N N N   L. ABD POLL BREVIS 1+ None None None None N N N N       Findings: Extremities were warmed with hot packs for 15 minutes prior to testing.  Sensory nerve conduction studies revealed bilaterally absent median responses.  The ulnar responses were normal and symmetric.  Motor nerve conduction studies revealed an absent left median response.  The right median response had a markedly delayed distal latency with acceptable amplitudes but slow conduction velocity.  Ulnar motor studies were normal and symmetric bilaterally.  Needle EMG of the bilateral upper extremities revealed 1+ positive sharp waves of the right abductor pollicis brevis and increased insertional activity of the left abductor pollicis brevis.  Examination was poorly tolerated so full APB muscle screening was not possible.  All other muscles tested were normal.  Impression:  1.  Abnormal study.  2.  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  The left is characterized by severe sensory axon loss.  The right is characterized by severe sensory axon loss and severe demyelination.  3.  No electrodiagnostic evidence of cervical radiculopathy bilaterally.      ASSESSMENT AND PLAN:  1. Bilateral carpal tunnel syndrome  The patient has clinical and electrodiagnostic evidence of carpal tunnel syndrome.  Electrodiagnostic parameters are quite severe.  The best recommendation is surgical consultation for decompression, especially given poor response to injection.        Thank you for the opportunity to participate in the care of this patient.  Sincerely,    Sixto Menchaca M.D.  Diplomate American Board of Physical Medicine and Rehabilitation

## 2024-06-14 NOTE — TELEPHONE ENCOUNTER
FMLA forms received via email- possible revision - valid auth in patient chart    Advised rep to advise about possible revision

## 2024-06-17 ENCOUNTER — TELEPHONE (OUTPATIENT)
Dept: PHYSICAL MEDICINE AND REHAB | Facility: CLINIC | Age: 44
End: 2024-06-17

## 2024-06-17 ENCOUNTER — OFFICE VISIT (OUTPATIENT)
Dept: PHYSICAL THERAPY | Facility: HOSPITAL | Age: 44
End: 2024-06-17
Attending: PHYSICAL MEDICINE & REHABILITATION
Payer: COMMERCIAL

## 2024-06-17 PROCEDURE — 97112 NEUROMUSCULAR REEDUCATION: CPT

## 2024-06-17 PROCEDURE — 97110 THERAPEUTIC EXERCISES: CPT

## 2024-06-17 PROCEDURE — 97530 THERAPEUTIC ACTIVITIES: CPT

## 2024-06-17 NOTE — TELEPHONE ENCOUNTER
Patient came in to request a sooner apt with Dr.Behar as EMG test was done 06/13/24 and she has her f/u until beginning of August, she is informing that this is to long of a wait, she can do a video or in person. Please call to advice.

## 2024-06-17 NOTE — PROGRESS NOTES
Manju BURT Leggin    12/10/1980  Diagnosis: Numbness in both hands (R20.0)  Cervical facet syndrome (M47.812)  Trigger point of neck (M54.2)  DDD (degenerative disc disease), cervical (M50.30)  Bilateral carpal tunnel syndrome (G56.03)    Referring Physician:  Alex Behar Next MD visit: 5/20/2024  Initial Evaluation Date: 4/30/2024  Date of Surgery: None for this diagnosis  Insurance: [employer]  Authorized # of visits:  NA by 4/29/2025  Plan of care to: 7/29/2024    Precautions/Hx: GERD, tubal ligation    SUBJECTIVE:     Pt reports that she is feeling less pain in the neck and upper back, but continues to have L hand pain.  She had injections that did not help.  She had a recent EMG reveling B sensory and R severe demyelination.  Pt notes that her pain and stiffness is much better in the upper quadrant and she has been consistent w her HEP.     Visit count  1/8 2/8 3/8   Date 4/30/2024 5/6/2024 6/17/2024    Cervical upper T      Pain Range 4-5 to 8/10 4-5 to 6/10 0 to 2-3/10   Pain Ave 7/10 5/10 1-2/10   Pain Current 6-7/10 5/10 0/10      OBJECTIVE:     Treatment performed this date:    Therapeutic Exercise:  Visit #   1/8 2/8 3/8   Position Exercise HEP 4/30/2024 5/6/2024 6/17/2024    Supine Median nerve glide H X  X     Ulnar nerve glide H X   X     Radial nerve glide H X  X     Cervical rotation ball H    X    Sidelying Trunk rotation book H   X 5x    Median nerve glide H X  X      Ulnar nerve glide H X  X      Radial nerve glide H X X      Trunk rotation chair H    X 3x    Trunk rotation windmill H    X 5x   Standing Wall median nerve glide H  X 10x     Corner stretch H  X 3x 3x    Doorway stretch H  X 3x    1/2 Foam roll Median nerve glides B    X 10x     B shldr abd   X 10x     B shldr flex   X 10x    Foam roll Median nerve glide B   X 10x     B shldr abd   X 6x     B shldr flex   X 10x    Neuro Re-ed   X see assessment  X see assessment    Ther Act Function   X progression for reaching above shldr  level X further education on condition as noted below    H = HEP. Pt given copies of this exercise for home program.  X = Exercises done this date - pt verbalized understanding and demonstrated competence. All exercises done B unless otherwise indicated.  Pt advised to discontinue exercises that increase pain and to call or return to therapist to discuss. Each intervention above is specifically prescribed to address the patients identified impairments, activity limitations, and participation restrictions.    ASSESSMENT     Pt doing well with good decrease of cervical and upper thoracic pain.  She continues to have CPS issues and (+) EMG. Discussion of carpel tunnel anatomy, nerve compression and surgical release.  Reviewed options for managing employment w discussion of difference between FMLA and short term disability.  Pt educated on importance of thoracic mobility for full postural control, mobility for cervical and lumbar spine, and normal neurological function.  Pt did well to add to her HEP for self mobilization as noted above.       Physical Therapy Goals:  From 4/30/2024 to 7/29/2024  - Created w patient input during initial assessment  1. Pt will be independent in beginning level of HEP for stretching, posture and strengthening.   2. Pt will be able to button shirt without increased symptoms.  3. Pt will be able to lift pots of water for cooking at work without increased symptoms.  4. Pt will be able to fix hair without increased symptoms.  5. Pt will be able to reach into upper cabinet without increased symptoms.     PLAN OF CARE:      Continue PT per original plan for therapeutic exercises, posture retraining, therapeutic activities, manual treatment, neuromuscular reeducation, therapeutic pain neuroscience education, patient education, self care home management, home exercise program, and modalities as needed.    Charged Units Units Minutes    Ther Ex 2 23   Neuro 1 12   Ther Activity 1 10   Gait     Man  Tx          Total Timed  45   Total Tx Time  45         _____________________________________________________________________________________________    21st Century Cures Act Notice to Patient: Medical documents like this are made available to patients in the interest of transparency. However, be advised this is a medical document and it is intended as tslx-in-eyef communication between your medical providers. This medical document may contain abbreviations, assessments, medical data, and results or other terms that are unfamiliar. Medical documents are intended to carry relevant information, facts as evident, and the clinical opinion of the practitioner. As such, this medical document may be written in language that appears blunt or direct. You are encouraged to contact your medical provider and/or [facility] Patient Experience if you have any questions about this medical document.    Objective Initial Evaluation Data 4/30/2024:    Neck Disability Index Score  Score: 42 % (4/29/2024  5:28 PM)      Posture:  increased thoracic kyphosis  Palpation: tight B UT    Dermatomes 4/30/2024       Lat neck (C4) R wnl   Lat neck (C4) L wnl   Ant deltoid (C5) R wnl   Ant deltoid (C5) L wnl   Dorsal prox thumb (C6) R wnl   Dorsal prox thumb (C6) L wnl   Dorsal prox 3 digit (C7) R wnl   Dorsal prox 3 digit (C7) L DULL   Dorsal 4-5 digits (C8) R wnl   Dorsal 4-5 digits (C8) L wnl   Med prox elbow (T1) R wnl   Med prox elbow (T1) L wnl       UE Neural Glides 4/30/2024   ULTT 1 R min   ULTT 1 L mod   ULTT 2 R Min-mod   ULTT 2 L  min   ULTT 3 R wnl   ULTT 3 L mod       Cervical ROM 4/30/2024   Fwd head 27   Flexion 60 nl 50   Extension 70 nl 48   R SB 45 nl 28*   L SB 45 nl 25*   R Rotation 80 nl 59*   L Rotation 80 nl 58*   *pain limiting     Shoulder ROM 4/30/2024   Flex R  180 nl 151   Flex L  180 nl 148*   Extn R  50 nl 48   Extn L  50 nl 48   Abd R  180 nl 169*   Abd L  180 nl 161*   ER R  90 nl 95   ER L  90 nl 85    IR R  80 nl 72   IR L  80 nl 70   *pain limiting    MMT 5/5 4/30/2024   C5 shldr abd R 4*   Shldr abd L 5   C6 biceps R 5   Biceps L 5   C7 Triceps R 5   Triceps L 5   C8 EPL R 5-   EPL L 4   T1 Interossei R 5   Interossei L 4+   ER R 5   ER L 4+   IR R 5   IR L 4+   *pain limiting      UE flexibility 4/30/2024   UT R Min-mod   UT L Min-mod   Scalenes R mod   Scalenes L mod   Lats R min   Lats L min       Imaging:   PROCEDURE: XR CERVICAL SPINE AP LAT FLEX EXT EM (CPT=72050)     COMPARISON: None.     INDICATIONS: Chronic neck pain with bilateral arm tingling and numbness.     TECHNIQUE: Cervical spine radiographs with flexion and extension views. (5 views)     FINDINGS:       ALIGNMENT: Normal alignment.    ATLANTOAXIAL: Normal odontoid and atlantoaxial joints.    VERTEBRAL BODIES:   Multilevel cervical spondylosis with disc space narrowing endplate marginal osteophyte formation most pronounced at C4-5 C5-6 C6-7.  Posterior endplate osteophyte encroachment C6-7.  Minimal degeneration at C2-3 and C3-4.    PREVERTEBRAL SOFT TISSUES: Negative.    FLEXION/EXTENSION: Normal range of motion.  No subluxation during flexion and extension.    OTHER: Negative.                 Impression   CONCLUSION:  1. Multilevel cervical spondylosis most pronounced at C6-7.    2. Normal range of motion with flexion extension  3. No acute fracture or acute malalignment.           Dictated by (CST): Te Prater MD on 4/29/2024 at 5:10 PM      Finalized by (CST): Te Prater MD on 4/29/2024 at 5:12 PM

## 2024-06-20 ENCOUNTER — TELEMEDICINE (OUTPATIENT)
Dept: PHYSICAL MEDICINE AND REHAB | Facility: CLINIC | Age: 44
End: 2024-06-20

## 2024-06-20 DIAGNOSIS — G56.03 BILATERAL CARPAL TUNNEL SYNDROME: Primary | ICD-10-CM

## 2024-06-20 DIAGNOSIS — R20.0 NUMBNESS IN BOTH HANDS: ICD-10-CM

## 2024-06-20 PROCEDURE — 99214 OFFICE O/P EST MOD 30 MIN: CPT | Performed by: PHYSICAL MEDICINE & REHABILITATION

## 2024-06-20 NOTE — PROGRESS NOTES
[facility]  Video Visit Progress Note      Telehealth outside of [facility]  Telehealth Verbal Consent   I conducted a telehealth visit with Manju Payne today, 06/20/24, which was completed using two-way, real-time interactive audio and video communication. This has been done in good hui to provide continuity of care in the best interest of the provider-patient relationship, due to the COVID -19 public health crisis/national emergency where restrictions of face-to-face office visits are ongoing. Every conscious effort was taken to allow for sufficient and adequate time to complete the visit.  The patient was made aware of the limitations of the telehealth visit, including treatment limitations as no physical exam could be performed.  The patient was advised to call 911 or to go to the ER in case there was an emergency.  The patient was also advised of the potential privacy & security concerns related to the telehealth platform.   The patient was made aware of where to find [facility]'s notice of privacy practices, telehealth consent form and other related consent forms and documents.  which are located on the [facility] website. The patient verbally agreed to telehealth consent form, related consents and the risks discussed.    Lastly, the patient confirmed that they were in [place].   Included in this visit, time may have been spent reviewing labs, medications, radiology tests and decision making. Appropriate medical decision-making and tests are ordered as detailed in the plan of care above.  Coding/billing information is submitted for this visit based on complexity of care and/or time spent for the visit.    CHIEF COMPLAINT:  No chief complaint on file.      History of Present Illness:  The patient is a 43 year old  right-handed female with no significant past medical history who presents with bilateral hand numbness and tingling primarily affecting digits 1 through 3.  She  is status post bilateral carpal tunnel corticosteroid injections on 5/20/2024 without improvement.  She also had an EMG and nerve conduction study performed which I reviewed from Dr. Menchaca.  This demonstrated severe carpal tunnel syndrome.  She has been in therapy but continues to have numbness and tingling.    PAST MEDICAL HISTORY:  Past Medical History:    Esophageal reflux    STD (female)       SURGICAL HISTORY:  Past Surgical History:   Procedure Laterality Date    Dilation/curettage,diagnostic      Dilation/curettage,diagnostic      Tubal ligation         SOCIAL HISTORY:   Social History     Occupational History    Not on file   Tobacco Use    Smoking status: Light Smoker    Smokeless tobacco: Never    Tobacco comments:     1-2 daily   Vaping Use    Vaping status: Never Used   Substance and Sexual Activity    Alcohol use: Yes     Comment: ON OCCASIONS    Drug use: Never    Sexual activity: Not on file       FAMILY HISTORY:   Family History   Problem Relation Age of Onset    Hypertension Mother     Diabetes Mother     Heart Disorder Mother     Diabetes Maternal Grandmother        CURRENT MEDICATIONS:   Current Outpatient Medications   Medication Sig Dispense Refill    tiZANidine 2 MG Oral Tab Take 1 tablet (2 mg total) by mouth every 6 (six) hours as needed (muscle cramping). (Patient not taking: Reported on 4/29/2024) 15 tablet 0    acetaminophen 500 MG Oral Tab acetaminophen 500 MG Oral Tab, [RxNorm: 627662] (Patient not taking: Reported on 3/11/2024)         ALLERGIES:   Allergies   Allergen Reactions    Shrimp        REVIEW OF SYSTEMS:   Review of Systems   Constitutional: Negative.    HENT: Negative.    Eyes: Negative.    Respiratory: Negative.    Cardiovascular: Negative.    Gastrointestinal: Negative.    Genitourinary: Negative.    Musculoskeletal: As per HPI  Skin: Negative.    Neurological: As per HPI  Endo/Heme/Allergies: Negative.    Psychiatric/Behavioral: Negative.      All other systems reviewed  and are negative. Pertinent positives and negatives noted in the HPI.        PHYSICAL EXAM:     There is no height or weight on file to calculate BMI.    General: No immediate distress  Head: Normocephalic/ Atraumatic  Eyes: Extra-occular movements intact  Ears/Nose/Throat:  External appearance identifies normal appearance without obvious deformity  Cardiovascular: No cyanosis, clubbing or edema  Respiratory: Non-labored respirations  Skin: No lesions noted   Neurological: alert & oriented x 3, attentive, able to follow commands, comprehention intact, spontaneous speech intact  Psychiatric: Mood and affect appropriate        Data  Admission on 01/17/2024, Discharged on 01/17/2024   Component Date Value Ref Range Status    Ventricular rate 01/17/2024 86  BPM Final    Atrial rate 01/17/2024 86  BPM Final    P-R Interval 01/17/2024 156  ms Final    QRS Duration 01/17/2024 78  ms Final    Q-T Interval 01/17/2024 362  ms Final    QTC Calculation (Bezet) 01/17/2024 433  ms Final    P Axis 01/17/2024 28  degrees Final    R Axis 01/17/2024 19  degrees Final    T Axis 01/17/2024 50  degrees Final    Glucose 01/17/2024 97  70 - 99 mg/dL Final    Sodium 01/17/2024 140  136 - 145 mmol/L Final    Potassium 01/17/2024 3.6  3.5 - 5.1 mmol/L Final    Chloride 01/17/2024 106  98 - 112 mmol/L Final    CO2 01/17/2024 27.0  21.0 - 32.0 mmol/L Final    Anion Gap 01/17/2024 7  0 - 18 mmol/L Final    BUN 01/17/2024 15  9 - 23 mg/dL Final    Creatinine 01/17/2024 0.72  0.55 - 1.02 mg/dL Final    BUN/CREA Ratio 01/17/2024 20.8 (H)  10.0 - 20.0 Final    Calcium, Total 01/17/2024 9.3  8.7 - 10.4 mg/dL Final    Calculated Osmolality 01/17/2024 291  275 - 295 mOsm/kg Final    eGFR-Cr 01/17/2024 106  >=60 mL/min/1.73m2 Final    ALT 01/17/2024 14  10 - 49 U/L Final    AST 01/17/2024 17  <=34 U/L Final    Alkaline Phosphatase 01/17/2024 62  37 - 98 U/L Final    Bilirubin, Total 01/17/2024 0.3  0.3 - 1.2 mg/dL Final    Total Protein 01/17/2024 7.7   5.7 - 8.2 g/dL Final    Albumin 01/17/2024 4.2  3.2 - 4.8 g/dL Final    Globulin  01/17/2024 3.5  2.8 - 4.4 g/dL Final    A/G Ratio 01/17/2024 1.2  1.0 - 2.0 Final    Hold Lavender 01/17/2024 Auto Resulted   Final    Hold Lt Green 01/17/2024 Auto Resulted   Final    Hold Blue 01/17/2024 Auto Resulted   Final    WBC 01/17/2024 7.5  4.0 - 11.0 x10(3) uL Final    RBC 01/17/2024 3.99  3.80 - 5.30 x10(6)uL Final    HGB 01/17/2024 11.6 (L)  12.0 - 16.0 g/dL Final    HCT 01/17/2024 36.3  35.0 - 48.0 % Final    MCV 01/17/2024 91.0  80.0 - 100.0 fL Final    MCH 01/17/2024 29.1  26.0 - 34.0 pg Final    MCHC 01/17/2024 32.0  31.0 - 37.0 g/dL Final    RDW-SD 01/17/2024 46.5 (H)  35.1 - 46.3 fL Final    RDW 01/17/2024 13.8  11.0 - 15.0 % Final    PLT 01/17/2024 342.0  150.0 - 450.0 10(3)uL Final    Neutrophil Absolute Prelim 01/17/2024 3.65  1.50 - 7.70 x10 (3) uL Final    Neutrophil Absolute 01/17/2024 3.65  1.50 - 7.70 x10(3) uL Final    Lymphocyte Absolute 01/17/2024 2.76  1.00 - 4.00 x10(3) uL Final    Monocyte Absolute 01/17/2024 0.56  0.10 - 1.00 x10(3) uL Final    Eosinophil Absolute 01/17/2024 0.45  0.00 - 0.70 x10(3) uL Final    Basophil Absolute 01/17/2024 0.04  0.00 - 0.20 x10(3) uL Final    Immature Granulocyte Absolute 01/17/2024 0.02  0.00 - 1.00 x10(3) uL Final    Neutrophil % 01/17/2024 48.8  % Final    Lymphocyte % 01/17/2024 36.9  % Final    Monocyte % 01/17/2024 7.5  % Final    Eosinophil % 01/17/2024 6.0  % Final    Basophil % 01/17/2024 0.5  % Final    Immature Granulocyte % 01/17/2024 0.3  % Final    Troponin I (High Sensitivity) 01/17/2024 92 (HH)  <=34 ng/L Final    TSH 01/17/2024 0.614  0.550 - 4.780 mIU/mL Final    D-Dimer 01/17/2024 0.59 (H)  <0.50 ug/mL FEU Final    Cholesterol, Total 01/17/2024 143  <200 mg/dL Final    HDL Cholesterol 01/17/2024 40  40 - 59 mg/dL Final    Triglycerides 01/17/2024 154 (H)  30 - 149 mg/dL Final    LDL Cholesterol 01/17/2024 76  <100 mg/dL Final    VLDL 01/17/2024 24  0  - 30 mg/dL Final    Non HDL Chol 01/17/2024 103  <130 mg/dL Final    Troponin I (High Sensitivity) 01/17/2024 87 (HH)  <=34 ng/L Final    Ventricular rate 01/17/2024 69  BPM Final    Atrial rate 01/17/2024 69  BPM Final    P-R Interval 01/17/2024 164  ms Final    QRS Duration 01/17/2024 74  ms Final    Q-T Interval 01/17/2024 398  ms Final    QTC Calculation (Bezet) 01/17/2024 426  ms Final    P Axis 01/17/2024 33  degrees Final    R Axis 01/17/2024 21  degrees Final    T Axis 01/17/2024 33  degrees Final   ]      Radiology Imaging:  I reviewed with the patient her X-ray of the cervical spine  XR CERVICAL SPINE AP LAT FLEX EXT EM (CPT=72050)  Narrative: PROCEDURE: XR CERVICAL SPINE AP LAT FLEX EXT EM (CPT=72050)     COMPARISON: None.     INDICATIONS: Chronic neck pain with bilateral arm tingling and numbness.     TECHNIQUE: Cervical spine radiographs with flexion and extension views. (5 views)     FINDINGS:         ALIGNMENT: Normal alignment.    ATLANTOAXIAL: Normal odontoid and atlantoaxial joints.    VERTEBRAL BODIES:    Multilevel cervical spondylosis with disc space narrowing endplate marginal osteophyte formation most pronounced at C4-5 C5-6 C6-7.  Posterior endplate osteophyte encroachment C6-7.  Minimal degeneration at C2-3 and C3-4.    PREVERTEBRAL SOFT TISSUES: Negative.    FLEXION/EXTENSION: Normal range of motion.  No subluxation during flexion and extension.    OTHER: Negative.                Impression: CONCLUSION:   1. Multilevel cervical spondylosis most pronounced at C6-7.    2. Normal range of motion with flexion extension  3. No acute fracture or acute malalignment.           Dictated by (CST): Te Prater MD on 4/29/2024 at 5:10 PM       Finalized by (CST): Te Prater MD on 4/29/2024 at 5:12 PM            EMG of the bilateral upper extremities performed by Dr. Menchaca on 6/13/2024 demonstrates the following:  Impression:  1.  Abnormal study.  2.  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  The left is characterized by severe sensory axon loss.  The right is characterized by severe sensory axon loss and severe demyelination.  3.  No electrodiagnostic evidence of cervical radiculopathy bilaterally  1. Bilateral carpal tunnel syndrome  The patient has clinical and electrodiagnostic evidence of carpal tunnel syndrome.  Electrodiagnostic parameters are quite severe.  The best recommendation is surgical consultation for decompression, especially given poor response to injection.      ASSESSMENT AND PLAN:  Manju is a pleasant 43-year-old female presents for follow-up of her bilateral hand numbness and tingling status post bilateral carpal tunnel corticosteroid injection without improvement.  She had an EMG and nerve conduction study performed which demonstrated severe carpal tunnel syndrome.  I have referred her to surgery for evaluation and consultation.  She will follow-up with me as needed.       RTC in as needed  Discharge Instructions were provided as documented in AVS summary.  The patient was in agreement with the assessment and plan.  All questions were answered.  There were no barriers to learning.    We discussed that a telemedicine visit is in place of an office visit; however, this limits the ability to perform a thorough physical examination which may affect objective findings related to a specific condition and can affect treatment.  We also discussed that NSAIDs may mask or worsen COVID-19 infection symptoms.  The patient was also informed that corticosteroids, in any form, may significantly decrease immune response and may increase risk and complications of infection.    The patient was advised that given the current situation with COVID-19, it is in his/her best interest to socially distance his/herself. Given this, we are not recommending any elective procedures or office visits at the outpatient surgery center or in the office  respectively unless deemed necessary.  My staff will be reaching out to the patient for the elective procedure when it is considered appropriate and the patient can follow-up in office as well when appropriate.  In the meantime, I will be available for telephone and video encounter.          1. Bilateral carpal tunnel syndrome    2. Numbness in both hands        Alex B. Behar MD  Physical Medicine and Rehabilitation/Sports Medicine  [facility]

## 2024-06-20 NOTE — PATIENT INSTRUCTIONS
1) please make an appointment with Dr. Gabriel Butler for a consultation for carpal tunnel syndrome.  If he is not available, then I would recommend Dr. Froilan Lucero  2) follow-up with me as needed going forward

## 2024-06-26 NOTE — TELEPHONE ENCOUNTER
I spoke with the patient she states that she does not feel comfortable to go back to work because she is not feeling well she feels like something is wrong with her she feels short of breath on and off.   I did advise her that if she is feeling short of lisa
[facility] to fax number provided by patient.
Patient called and advised that she needs a note excusing her for the next 2 weeks from work. She needs this for her work. She needs us to Fax it to [facility]. She will call us back with their fax number. Please Advise.
Patient called to give Fax ph# for [facility]    Fax: [phone]  ATTN: Les Oleary
Why next 2 weeks ?     she can go back on Monday
Opt out

## 2024-06-27 ENCOUNTER — APPOINTMENT (OUTPATIENT)
Dept: PHYSICAL THERAPY | Facility: HOSPITAL | Age: 44
End: 2024-06-27
Attending: PHYSICAL MEDICINE & REHABILITATION
Payer: COMMERCIAL

## 2025-04-18 NOTE — PROGRESS NOTES
Anesthesia Pre Eval Note    Anesthesia ROS/Med Hx          Pulmonary Review:    Positive for sleep apnea     Neuro/Psych Review:    Positive for seizures   Positive for CVA  Positive for headaches  Positive for psychiatric history    Cardiovascular Review:     Positive for hypertension  Positive for hyperlipidemia    GI/HEPATIC/RENAL Review:     Positive for GERD  Positive for hepatitis Positive for liver disease   Positive for renal disease    End/Other Review:  Positive for diabetes  Positive for obesity   Positive for anemia  Positive for arthritis  Positive for chronic pain  Positive for bleeding disorder      Relevant Problems   Anesthesia Problems   (+) Obstructive sleep apnea       Physical Exam     Airway   Mallampati: III  TM Distance: >3 FB  Neck ROM: Full    Cardiovascular    Cardio Rhythm: Regular  Cardio Rate: Normal    Pulmonary Exam    Breath sounds clear to auscultation:  Yes      Anesthesia Plan:    ASA Status: 3  Anesthesia Type: General    Induction: Intravenous  Preferred Airway Type: LMA  Maintenance: Inhalational  Checklist  Reviewed: Problem list, Medications, Allergies, NPO Status, Anesthesia Record and Past Med History  Consent/Risks Discussed Statement:  The proposed anesthetic plan, including its risks and benefits, have been discussed with the Patient along with the risks and benefits of alternatives. Questions were encouraged and answered and the patient and/or representative understands and agrees to proceed.        I discussed with the patient (and/or patient's legal representative) the risks and benefits of the proposed anesthesia plan, General, which may include services performed by other anesthesia providers.    Alternative anesthesia plans, if available, were reviewed with the patient (and/or patient's legal representative). Discussion has been held with the patient (and/or patient's legal representative) regarding risks of anesthesia, which include  emergent situations that may  This is a telemedicine visit with live, interactive video and audio. Patient understands and accepts financial responsibility for any deductible, co-insurance and/or co-pays associated with this service.     SUBJECTIVE  She is scheduled for the visit to require change in anesthesia plan.    The patient (and/or patient's legal representative) has indicated understanding, his/her questions have been answered, and he/she wishes to proceed with the planned anesthetic.    Blood Products: Not Anticipated

## 2025-06-11 ENCOUNTER — OFFICE VISIT (OUTPATIENT)
Dept: INTERNAL MEDICINE CLINIC | Facility: CLINIC | Age: 45
End: 2025-06-11

## 2025-06-11 VITALS
DIASTOLIC BLOOD PRESSURE: 79 MMHG | HEART RATE: 98 BPM | SYSTOLIC BLOOD PRESSURE: 119 MMHG | WEIGHT: 244.81 LBS | HEIGHT: 67 IN | BODY MASS INDEX: 38.42 KG/M2 | TEMPERATURE: 97 F

## 2025-06-11 DIAGNOSIS — N30.00 ACUTE CYSTITIS WITHOUT HEMATURIA: ICD-10-CM

## 2025-06-11 DIAGNOSIS — R10.2 PELVIC PAIN: Primary | ICD-10-CM

## 2025-06-11 DIAGNOSIS — Z12.31 ENCOUNTER FOR SCREENING MAMMOGRAM FOR MALIGNANT NEOPLASM OF BREAST: ICD-10-CM

## 2025-06-11 PROCEDURE — 99213 OFFICE O/P EST LOW 20 MIN: CPT | Performed by: INTERNAL MEDICINE

## 2025-06-11 NOTE — PROGRESS NOTES
Subjective:     Patient ID: Manju Payne is a 44 year old female.    Abdominal Pain      She came in today complaining of some pelvic discomfort/cramping.  According to her this is ongoing for more than a week.  She feels like menstrual cramps.  She feels better after she urinates.  She denies any other associated symptoms she denies any UTI symptoms.  Her menstrual period is regular usually last 5 days  History/Other:   Review of Systems   Constitutional: Negative.    HENT: Negative.     Eyes: Negative.    Respiratory: Negative.     Cardiovascular: Negative.    Gastrointestinal:  Positive for abdominal pain.   Endocrine: Negative.    Genitourinary: Negative.    Musculoskeletal: Negative.    Skin: Negative.    Neurological: Negative.    Hematological: Negative.    Psychiatric/Behavioral: Negative.       Current Medications[1]  Allergies:Allergies[2]    Past Medical History[3]   Past Surgical History[4]   Family History[5]   Social History: Short Social Hx on File[6]     Objective:   Physical Exam  Vitals and nursing note reviewed.   Constitutional:       Appearance: Normal appearance.   HENT:      Head: Normocephalic and atraumatic.   Cardiovascular:      Rate and Rhythm: Normal rate and regular rhythm.      Pulses: Normal pulses.      Heart sounds: Normal heart sounds.   Pulmonary:      Effort: Pulmonary effort is normal.      Breath sounds: Normal breath sounds.   Musculoskeletal:      Cervical back: Normal range of motion and neck supple.   Skin:     General: Skin is warm.   Neurological:      Mental Status: She is alert. Mental status is at baseline.         Assessment & Plan:   1. Pelvic pain    I will check urine urine culture also I will order pelvic ultrasound    No orders of the defined types were placed in this encounter.      Meds This Visit:  Requested Prescriptions      No prescriptions requested or ordered in this encounter       Imaging & Referrals:  US PELVIS (TRANSABDOMINAL AND TRANSVAGINAL)  (DUK=[id number])            [1]   Current Outpatient Medications   Medication Sig Dispense Refill    tiZANidine 2 MG Oral Tab Take 1 tablet (2 mg total) by mouth every 6 (six) hours as needed (muscle cramping). (Patient not taking: Reported on 6/11/2025) 15 tablet 0    acetaminophen 500 MG Oral Tab acetaminophen 500 MG Oral Tab, [RxNorm: 328399] (Patient not taking: Reported on 6/11/2025)     [2]   Allergies  Allergen Reactions    Shrimp    [3]   Past Medical History:   Esophageal reflux    STD (female)   [4]   Past Surgical History:  Procedure Laterality Date    Dilation/curettage,diagnostic      Dilation/curettage,diagnostic      Tubal ligation     [5]   Family History  Problem Relation Age of Onset    Hypertension Mother     Diabetes Mother     Heart Disorder Mother     Diabetes Maternal Grandmother    [6]   Social History  Socioeconomic History    Marital status:    Tobacco Use    Smoking status: Light Smoker    Smokeless tobacco: Never    Tobacco comments:     1-2 daily   Vaping Use    Vaping status: Never Used   Substance and Sexual Activity    Alcohol use: Yes     Comment: ON OCCASIONS    Drug use: Never

## 2025-06-20 ENCOUNTER — HOSPITAL ENCOUNTER (OUTPATIENT)
Dept: MAMMOGRAPHY | Facility: HOSPITAL | Age: 45
Discharge: HOME | End: 2025-06-20
Attending: INTERNAL MEDICINE
Payer: COMMERCIAL

## 2025-06-20 DIAGNOSIS — Z12.31 ENCOUNTER FOR SCREENING MAMMOGRAM FOR MALIGNANT NEOPLASM OF BREAST: ICD-10-CM

## 2025-06-20 PROCEDURE — 77063 BREAST TOMOSYNTHESIS BI: CPT | Performed by: INTERNAL MEDICINE

## 2025-06-20 PROCEDURE — 77067 SCR MAMMO BI INCL CAD: CPT | Performed by: INTERNAL MEDICINE

## 2025-07-11 ENCOUNTER — HOSPITAL ENCOUNTER (OUTPATIENT)
Dept: ULTRASOUND IMAGING | Facility: HOSPITAL | Age: 45
Discharge: HOME | End: 2025-07-11
Attending: INTERNAL MEDICINE
Payer: COMMERCIAL

## 2025-07-11 DIAGNOSIS — R10.2 PELVIC PAIN: ICD-10-CM

## 2025-07-11 PROCEDURE — 76830 TRANSVAGINAL US NON-OB: CPT | Performed by: INTERNAL MEDICINE

## 2025-07-11 PROCEDURE — 76856 US EXAM PELVIC COMPLETE: CPT | Performed by: INTERNAL MEDICINE

## 2025-07-21 ENCOUNTER — OFFICE VISIT (OUTPATIENT)
Dept: OBGYN CLINIC | Facility: CLINIC | Age: 45
End: 2025-07-21
Payer: COMMERCIAL

## 2025-07-21 VITALS
WEIGHT: 246 LBS | DIASTOLIC BLOOD PRESSURE: 81 MMHG | HEART RATE: 78 BPM | BODY MASS INDEX: 38.61 KG/M2 | HEIGHT: 67 IN | SYSTOLIC BLOOD PRESSURE: 124 MMHG

## 2025-07-21 DIAGNOSIS — Z11.3 SCREEN FOR STD (SEXUALLY TRANSMITTED DISEASE): ICD-10-CM

## 2025-07-21 DIAGNOSIS — R10.2 PELVIC CRAMPING: Primary | ICD-10-CM

## 2025-07-21 PROCEDURE — 99203 OFFICE O/P NEW LOW 30 MIN: CPT | Performed by: ADVANCED PRACTICE MIDWIFE

## 2025-07-22 LAB
BV BACTERIA DNA VAG QL NAA+PROBE: POSITIVE
C GLABRATA DNA VAG QL NAA+PROBE: NEGATIVE
C KRUSEI DNA VAG QL NAA+PROBE: NEGATIVE
C TRACH DNA SPEC QL NAA+PROBE: NEGATIVE
CANDIDA DNA VAG QL NAA+PROBE: POSITIVE
N GONORRHOEA DNA SPEC QL NAA+PROBE: NEGATIVE
T VAGINALIS DNA VAG QL NAA+PROBE: NEGATIVE

## 2025-07-23 ENCOUNTER — RESULTS FOLLOW-UP (OUTPATIENT)
Dept: OBGYN CLINIC | Facility: CLINIC | Age: 45
End: 2025-07-23

## 2025-07-23 DIAGNOSIS — N76.0 VAGINITIS AND VULVOVAGINITIS: Primary | ICD-10-CM

## 2025-07-23 RX ORDER — FLUCONAZOLE 150 MG/1
150 TABLET ORAL
Qty: 2 TABLET | Refills: 0 | Status: SHIPPED | OUTPATIENT
Start: 2025-07-23 | End: 2025-07-27

## 2025-07-23 RX ORDER — METRONIDAZOLE 500 MG/1
500 TABLET ORAL 2 TIMES DAILY
Qty: 14 TABLET | Refills: 0 | Status: SHIPPED | OUTPATIENT
Start: 2025-07-23 | End: 2025-07-30

## 2025-08-15 ENCOUNTER — OFFICE VISIT (OUTPATIENT)
Dept: OBGYN CLINIC | Facility: CLINIC | Age: 45
End: 2025-08-15

## 2025-08-15 VITALS — DIASTOLIC BLOOD PRESSURE: 87 MMHG | SYSTOLIC BLOOD PRESSURE: 132 MMHG | HEART RATE: 85 BPM

## 2025-08-15 DIAGNOSIS — F17.210 CIGARETTE SMOKER: ICD-10-CM

## 2025-08-15 DIAGNOSIS — N81.10 PROLAPSE OF FEMALE BLADDER, ACQUIRED: ICD-10-CM

## 2025-08-15 DIAGNOSIS — F17.200 SMOKER: ICD-10-CM

## 2025-08-15 DIAGNOSIS — Z01.419 ENCOUNTER FOR ROUTINE GYNECOLOGICAL EXAMINATION WITH PAPANICOLAOU SMEAR OF CERVIX: Primary | ICD-10-CM

## 2025-08-15 PROBLEM — E11.9 DIABETES MELLITUS, TYPE 2 (HCC): Status: ACTIVE | Noted: 2023-11-28

## 2025-08-15 PROCEDURE — 99396 PREV VISIT EST AGE 40-64: CPT | Performed by: ADVANCED PRACTICE MIDWIFE

## 2025-08-15 PROCEDURE — 99406 BEHAV CHNG SMOKING 3-10 MIN: CPT | Performed by: ADVANCED PRACTICE MIDWIFE

## 2025-08-18 LAB — HPV E6+E7 MRNA CVX QL NAA+PROBE: NEGATIVE

## (undated) NOTE — LETTER
AUTHORIZATION FOR SURGICAL OPERATION OR OTHER PROCEDURE    1. I hereby authorize Dr. Alex Behar and the [facility] staff assigned to my case to perform the following operation and/or procedure at the [facility]:     Bilateral carpal tunnel CSI under ultrasound guidance     2.  My physician has explained the nature and purpose of the operation or other procedure, possible alternative methods of treatment, the risks involved, and the possibility of complication to me.  I acknowledge that no guarantee has been made as to the result that may be obtained.  3.  I recognize that, during the course of this operation, or other procedure, unforseen conditions may necessitate additional or different procedure than those listed above.  I, therefore, further authorize and request that the above named physician, his/her physician assistants or designees perform such procedures as are, in his/her professional opinion, necessary and desirable.  4.  Any tissue or organs removed in the operation or other procedure may be disposed of by and at the discretion of the [facility] staff and [facility].  5.  I understand that in the event of a medical emergency, I will be transported by local paramedics to [facility] or other hospital emergency department.  6.  I certify that I have read and fully understand the above consent to operation and/or other procedure.    7.  I acknowledge that my physician has explained sedation/analgesia administration to me including the risks and benefits.  I consent to the administration of sedation/analgesia as may be necessary or desirable in the judgement of my physician.    Witness signature: ___________________________________________________ Date:  ______/______/_____                    Time:  ________ A.M.  P.M.       Patient Name:  Manju Payne  12/10/1980  [id number]       Patient signature:   ___________________________________________________                   Statement of Physician  My signature below affirms that prior to the time of the procedure, I have explained to the patient and/or his/her guardian, the risks and benefits involved in the proposed treatment and any reasonable alternative to the proposed treatment.  I have also explained the risks and benefits involved in the refusal of the proposed treatment and have answered the patient's questions.                        Date:  ______/______/_______  Provider                      Signature:  __________________________________________________________       Time:  ___________ AJEAN HARPER

## (undated) NOTE — LETTER
Consent for Coronary CT Angiography  Date of Procedure:   on Leda Marinelli    Your doctor has recommended that you have a Coronary CT Angiography procedure. Coronary CT Angiography is a diagnostic procedure using computed tomography to scan the chest and coronary arteries. It is a non-invasive technique that allows clear visualization of narrowed and blocked arteries. Blockage in these arteries may lead to heart attack or stroke. You will lie on a table that slides slowly into a large circular opening called the CT gantry. The procedure will be performed by the CT Staff, including a nurse, a technologist, and a patient assistant. The staff will be with you throughout the entire procedure to explain each step, make you as comfortable as possible, and answer all of your questions. The staff will take a series of images of your heart and chest to help define the area to be imaged. You will be asked to hold your breath for a short time as each series of images is performed and acquired. You may receive medication called a beta blocker that will slow your hear rate down. This is needed so we can obtain better images of your heart. You may also receive a nitroglycerine spray under your tongue. This medication is given to dilate the arteries for better picture quality. The nitroglycerine may cause a drop in blood pressure. During the procedure you will receive an injection of a contrast material, which assists the physician in highlighting the anatomy of your heart. You may experience a warm sensation through your body and a metallic taste in your mouth. In rare circumstances, a rash and/or hives may occur. It's very important to inform your care giver of any changes you may feel or experience. The medication and the contrast material used as part of your procedure are all deemed very safe, however there is always an element of risk to a patient when taking medication.  Allergic reactions to medication can range from very minor to very serious, leading to a life threatening situation or even death. Please be sure to communicate any allergy you may have to your caregiver immediately. The information that is obtained during your testing will be treated as privileged and confidential. The information obtained will be given to your doctor and may be used for insurance purposes or to track and trend data as part of  with your privacy retained. Monsternando Barriga, have read and understand the above information. I voluntarily agree and consent to have the Coronary CT Angiography (CTA) Exam. Furthermore, no guarantees or assurances have been given by anyone as to the results that may be obtained from this procedure. Any questions that may have occurred to me have been answered to my satisfaction.     Signature of Patient: __________________________Date: ___________Time: _______      Patient Name: Neyda Blair    : 12/10/1980      Printed: 2023      Medical Record #: [id number]

## (undated) NOTE — ED AVS SNAPSHOT
Ms. Eugenie Gonzalez   MRN: [id number]    Department:  [facility] Emergency Department   Date of Visit:  8/31/2019           Disclosure     Insurance plans vary and the physician(s) referred by the ER may not be covered by your plan.  Please cont CARE PHYSICIAN AT ONCE OR RETURN IMMEDIATELY TO THE EMERGENCY DEPARTMENT. If you have been prescribed any medication(s), please fill your prescription right away and begin taking the medication(s) as directed.   If you believe that any of the medications

## (undated) NOTE — ED AVS SNAPSHOT
Viry Hylton   MRN: [id number]    Department:  [facility] Emergency Department   Date of Visit:  7/30/2018           Disclosure     Insurance plans vary and the physician(s) referred by the ER may not be covered by your plan.  Please contact CARE PHYSICIAN AT ONCE OR RETURN IMMEDIATELY TO THE EMERGENCY DEPARTMENT. If you have been prescribed any medication(s), please fill your prescription right away and begin taking the medication(s) as directed.   If you believe that any of the medications

## (undated) NOTE — ED AVS SNAPSHOT
Karen Quinones   MRN: [id number]    Department:  [facility] Emergency Department   Date of Visit:  2/22/2018           Disclosure     Insurance plans vary and the physician(s) referred by the ER may not be covered by your plan.  Please contact CARE PHYSICIAN AT ONCE OR RETURN IMMEDIATELY TO THE EMERGENCY DEPARTMENT. If you have been prescribed any medication(s), please fill your prescription right away and begin taking the medication(s) as directed.   If you believe that any of the medications

## (undated) NOTE — ED AVS SNAPSHOT
Jaimee Costello   MRN: [id number]    Department:  [facility] Emergency Department   Date of Visit:  11/14/2018           Disclosure     Insurance plans vary and the physician(s) referred by the ER may not be covered by your plan.  Please contact CARE PHYSICIAN AT ONCE OR RETURN IMMEDIATELY TO THE EMERGENCY DEPARTMENT. If you have been prescribed any medication(s), please fill your prescription right away and begin taking the medication(s) as directed.   If you believe that any of the medications

## (undated) NOTE — Clinical Note
Dear Dr. Tucker,  I had the opportunity to see your patient Manju Payne recently. I appreciate your confidence in me to care for your patients. Please feel free call me with any questions at [phone] or contact me through Epic.  Sincerely, Shadi Menchaca MD Board Certified, Physical Medicine and Rehabilitation Specializing in Sports Medicine, Spine Medicine and Electrodiagnostic Medicine [facility]  CC: Dr. Behar

## (undated) NOTE — LETTER
3/9/2021          To Whom It May Concern:    Anyi Wise is currently under my medical care and may not return to work at this time. Please excuse Homero Martinez for 4 days. She may return to work on Monday 3/15/2021.       If you require additional informa

## (undated) NOTE — ED AVS SNAPSHOT
Ms. Eddie Holder   MRN: [id number]    Department:  [facility] Emergency Department   Date of Visit:  7/28/2019           Disclosure     Insurance plans vary and the physician(s) referred by the ER may not be covered by your plan.  Please cont CARE PHYSICIAN AT ONCE OR RETURN IMMEDIATELY TO THE EMERGENCY DEPARTMENT. If you have been prescribed any medication(s), please fill your prescription right away and begin taking the medication(s) as directed.   If you believe that any of the medications

## (undated) NOTE — LETTER
Date: May 20, 2024      Patient Name: Manju Payne      : 12/10/1980        Thank you for choosing [facility] as your health care provider. Your physician has deemed the following medical service(s) necessary. However, your insurance plan may not pay for all of your health care and costs and may deny payment for this service. The fact that your insurance plan does not pay for an item or service does not mean you should not receive it. The purpose of this form is to help you make an informed decision about whether or not you want to receive this service(s) that may not be paid for by your insurance plan.    CPT Code Description     Cost      Bilateral carpal tunnel CSI under ultrasound guidance     _________ ______________________________ _____________      _________ ______________________________ _____________      I understand that the above mentioned service(s) or supply may not be covered by my insurance company. I agree to be financially responsible for the cost of this service or supply in the event of my insurance denies payment as a non-covered benefit.        ______________________________________________________________________  Signature of Patient or Patient's Representative  Relationship  Date    ______________________________________________________________________  Signature of Witness to signing of form   Printed Name

## (undated) NOTE — LETTER
Date & Time: 3/11/2024, 5:04 PM  Patient: Manju Payne  Encounter Provider(s):    Sudarshan Anand APRN       To Whom It May Concern:    Manju Payne was seen and treated in our department on 3/11/2024. She should not return to work until 3/14/24 .    If you have any questions or concerns, please do not hesitate to call.        _____________________________  Physician/APC Signature

## (undated) NOTE — LETTER
No information on file. Consent for Coronary CT Angiography    Date of Procedure: 01/09/2023     on Henrietta Hearing    Your doctor has recommended that you have a Coronary CT Angiography procedure. Coronary CT Angiography is a diagnostic procedure using computed tomography to scan the chest and coronary arteries. It is a non-invasive technique that allows clear visualization of narrowed and blocked arteries. Blockage in these arteries may lead to heart attack or stroke. You will lie on a table that slides slowly into a large circular opening called the CT gantry. The procedure will be performed by the CT Staff, including a nurse, a technologist, and a patient assistant. The staff will be with you throughout the entire procedure to explain each step, make you as comfortable as possible, and answer all of your questions. The staff will take a series of images of your heart and chest to help define the area to be imaged. You will be asked to hold your breath for a short time as each series of images is performed and acquired. You may receive medication called a beta blocker that will slow your hear rate down. This is needed so we can obtain better images of your heart. You may also receive a nitroglycerine spray under your tongue. This medication is given to dilate the arteries for better picture quality. The nitroglycerine may cause a drop in blood pressure. During the procedure you will receive an injection of a contrast material, which assists the physician in highlighting the anatomy of your heart. You may experience a warm sensation through your body and a metallic taste in your mouth. In rare circumstances, a rash and/or hives may occur. It's very important to inform your care giver of any changes you may feel or experience.      The medication and the contrast material used as part of your procedure are all deemed very safe, however there is always an element of risk to a patient when taking medication. Allergic reactions to medication can range from very minor to very serious, leading to a life threatening situation or even death. Please be sure to communicate any allergy you may have to your caregiver immediately. The information that is obtained during your testing will be treated as privileged and confidential. The information obtained will be given to your doctor and may be used for insurance purposes or to track and trend data as part of  with your privacy retained. Azul Mustafa, have read and understand the above information. I voluntarily agree and consent to have the Coronary CT Angiography (CTA) Exam. Furthermore, no guarantees or assurances have been given by anyone as to the results that may be obtained from this procedure. Any questions that may have occurred to me have been answered to my satisfaction.     Signature of Patient: __________________________Date: ___________Time: _______      Patient Name: Sasha Lyn    : 12/10/1980      Printed: 2023      Medical Record #: [id number]

## (undated) NOTE — LETTER
Date & Time: 1/6/2024, 12:35 PM  Patient: Manju Payne  Encounter Provider(s):    Charan Smith MD       To Whom It May Concern:    Manju Payne was seen and treated in our department on 1/6/2024. She is excused from work today.    If you have any questions or concerns, please do not hesitate to call.        _____________________________  Physician/APC Signature

## (undated) NOTE — LETTER
Date & Time: 3/6/2021, 12:40 PM  Patient: Nathalie Jurado  Encounter Provider(s): Haroon Reid MD       To Whom It May Concern:    Kapil Landry was seen and treated in our department on 3/6/2021. She may return to work 3/7/2021.     If you have any

## (undated) NOTE — LETTER
Date & Time: 1/17/2024, 10:08 PM  Patient: Manju Payne  Encounter Provider(s):    Arturo Stark MD       To Whom It May Concern:    Manju Payne was seen and treated in our department on 1/17/2024. She can return to work 1-.    If you have any questions or concerns, please do not hesitate to call.        _____________________________  Physician/APC Signature

## (undated) NOTE — LETTER
12/16/20    Tona Hester      To Whom It May Concern:     This letter has been written at the patient's request. The above patient was seen at [facility] for treatment of a medical condition from 12/16/20 to 12/17/20    The patient may return to wor